# Patient Record
Sex: FEMALE | Race: WHITE | URBAN - METROPOLITAN AREA
[De-identification: names, ages, dates, MRNs, and addresses within clinical notes are randomized per-mention and may not be internally consistent; named-entity substitution may affect disease eponyms.]

---

## 2020-11-25 ENCOUNTER — OFFICE VISIT (OUTPATIENT)
Dept: ENT CLINIC | Age: 65
End: 2020-11-25
Payer: COMMERCIAL

## 2020-11-25 VITALS
SYSTOLIC BLOOD PRESSURE: 126 MMHG | OXYGEN SATURATION: 98 % | TEMPERATURE: 97.5 F | DIASTOLIC BLOOD PRESSURE: 90 MMHG | HEART RATE: 78 BPM | WEIGHT: 195 LBS | HEIGHT: 64 IN | RESPIRATION RATE: 16 BRPM | BODY MASS INDEX: 33.29 KG/M2

## 2020-11-25 PROCEDURE — 99203 OFFICE O/P NEW LOW 30 MIN: CPT | Performed by: OTOLARYNGOLOGY

## 2020-11-25 RX ORDER — AMLODIPINE BESYLATE 10 MG/1
10 TABLET ORAL DAILY
COMMUNITY

## 2020-11-25 RX ORDER — AMOXICILLIN AND CLAVULANATE POTASSIUM 875; 125 MG/1; MG/1
1 TABLET, FILM COATED ORAL 2 TIMES DAILY
Qty: 28 TABLET | Refills: 0 | Status: SHIPPED | OUTPATIENT
Start: 2020-11-25 | End: 2020-11-25

## 2020-11-25 NOTE — PROGRESS NOTES
[facility]       NEW PATIENT VISIT    PCP:  Ricky Babcock PA-C    REFERRED BY:   [facility]      CHIEF COMPLAINT:  Chief Complaint   Patient presents with    Ear Problem       HISTORY OF PRESENT ILLNESS:       Cheli Rose is a 63 y.o. female here for evaluation and treatment of a problem located in the ears. The quality is hearing loss. The severity is moderate. The duration is one year. The timing is constant. Associated symptoms include otalgia, hearing loss, and tinnitus left ear. Otalgia, \"sometimes, when I be digging and scratchin in em. \"  Has had the hearing loss, left ear, past one year. \"Sometimes I can't hear. \"  Tinnitus left ear, sometimes, twice a year last for about 6 hours, for about one year. Tender to touch. \"Sometimes my ear is itchy. \"   \"Sometimes my throat swells up and I taste blood.'  She takes two allergy pills a day. She has had swelling of her submandibular gland. IZZY Babcock \"said it was a gland that not producing saliva. \"  She has been using \"sour patches\" candy per recommendation of PCP. I have a sonogram.  No treatment so far just the candy. REVIEW OF SYSTEMS:    CONSTITUTIONAL:  Denied fever. Denied unexplained weight loss, over 20 pounds in the past six months. EARS, NOSE, THROAT:   Denied otorrhea, rhinorrhea, nasal dyspnea, epistaxis,  sore throat and hoarseness. PAST MEDICAL HISTORY:    History reviewed. No pertinent past medical history. History reviewed. No pertinent surgical history. EXAMINATION:    Vitals:    11/25/20 1109   BP: (!) 126/90   Site: Left Upper Arm   Position: Sitting   Cuff Size: Medium Adult   Pulse: 78   Resp: 16   Temp: 97.5 °F (36.4 °C)   TempSrc: Tympanic   SpO2: 98%   Weight: 195 lb (88.5 kg)   Height: 5' 4\" (1.626 m)     VITALS SIGNS were reviewed. GENERAL APPEARANCE: WDWN NAD, Alert and oriented X 3.   EYES: Extraocular motion was intact, bilaterally. Normal primary gaze alignment. Sclera and conjunctiva clear bilaterally. ABILITY TO COMMUNICATE/QUALITY OF VOICE:  Normal, no hoarseness or hot potato quality. (+) SALIVARY GLANDS: There was mild enlargement and induration of the left submandibular gland with mild tenderness. The right submandibular gland was normal.  The parotid gland was normal bilaterally. INSPECTION, HEAD AND FACE:  Normal with no masses, lesions, tenderness, or deformities. FACIAL STRENGTH, MOTION:  Facial nerve function intact and equal bilaterally for all 5 branches. SINUSES:  Frontal sinuses and maxillary sinuses nontender, bilaterally. HEARING ASSESSMENT:  Hearing was intact to finger rub at the external meatus bilaterally. Tuning fork tests, using a 512 Hz tuning fork, showed the Varner test was heard in the midline. The Rinne test showed air conduction greater than bone conduction bilaterally. EXTERNAL EAR/NOSE:  The pinnae, mastoids, and external nose were normal bilaterally. EARS, OTOSCOPY:  The tympanic membranes and external auditory canals were normal bilaterally. NOSE:  The nasal septum was midline. The inferior turbinates were normal bilaterally. Nasal mucosa and nasal secretions were normal bilaterally. LIPS, TEETH AND GUMS:  Normal.  OROPHARYNX/ORAL CAVITY:  Normal mucosa with no ulcerations, masses, lesions, erythema, exudate, or other abnormalities. NECK:  Normal with no masses, tenderness, or tracheal deviation, and with normal laryngeal crepitus. THYROID:  Normal, with no nodules, goiter, or tenderness bilaterally. LYMPH NODES, CERVICAL, FACIAL AND SUPRACLAVICULAR:  No lymphadenopathy detected. Jv Rodriguez / Salma Russell / Елена Norman:       Dayton Hager was seen today for ear problem. Diagnoses and all orders for this visit:    Sialoadenitis of submandibular gland  Comments:  left   Orders:  -     Amoxicillin-clavulanate (AUGMENTIN) 875-125 MG per tablet;  Take 1 twice daily while taking the antibiotic.   Continue for two to three days after completion of the antibiotic.      ~~~>>> Also please read all information given to you by the pharmacist.

## 2020-11-25 NOTE — PATIENT INSTRUCTIONS
SUBMANDIBULAR SALIVARY GLAND INFECTION MEASURES  1. Use lemon slice sialogogues: 1/4 inch slice cut in half, bite and suck slowly for 5 minutes, then massage the left submandibular gland for five minutes, four times daily. Rinse your mouth with clear water after using the lemon slice. 2. Heating pad therapy:  Apply heat to the affected area as directed, four times daily, on medium heat setting, for 30 to 45 minutes, with a one minute break every 5 minutes. ADVERSE AND SIDE EFFECTS OF MEDICATIONS:    Please be aware of the following possible adverse reactions, side effects, and complications of the following medications, including, but not limited to: allergic reaction, interactions with other medications, nausea, headache, diarrhea, persistent symptoms, failure to improve, and the following:       Augmentin:  diarrhea, colitis (severe infection and inflammation of the large intestine), pseudomembranous colitis (severe infection and inflammation of the colon, usually due to C. difficile bacteria)  yeast or fungal  infections, Kaye-Dat syndrome (very rare necrotic skin reaction with peeling of skin, blisters and arthritis), persistent symptoms/infection, and failure to improve. Taking Probiotic while you are taking antibiotics, may help to prevent diarrhea, stomach upset, pseudomembranous colitis, and C. difficile diarrhea. This may be obtained at your pharmacy or health food store. Alternatively, you may eat one cup of yogurt with active or live cultures twice daily while taking the antibiotic.   Continue for two to three days after completion of the antibiotic.      ~~~>>> Also please read all information given to you by the pharmacist.

## 2020-12-07 ENCOUNTER — PROCEDURE VISIT (OUTPATIENT)
Dept: AUDIOLOGY | Age: 65
End: 2020-12-07
Payer: COMMERCIAL

## 2020-12-07 PROCEDURE — 92557 COMPREHENSIVE HEARING TEST: CPT | Performed by: AUDIOLOGIST

## 2020-12-07 PROCEDURE — 92550 TYMPANOMETRY & REFLEX THRESH: CPT | Performed by: AUDIOLOGIST

## 2020-12-07 NOTE — PROGRESS NOTES
[facility]  Division of Audiology/Otolaryngology        PCP:  No PCP  MRN: [id number]     CHIEF COMPLAINT: Itchy Ears, Tinnitus      HISTORY OF PRESENT ILLNESS  Jun Holt was seen for hearing and middle ear evaluation. Otolaryngology  is referral source of today's appointment. Patient presents with itchy ears as primary concern, in addition to tinnitus. The degree of itching is reportedly severe. AUDIOGRAM & IMMITTANCE    Hearing status is essentially mild, bilaterally. The loss is of sensory nature. Loss is symmetrical. Good speech discernment demonstrated in quiet, at elevated levels. Immittance testing is consistent with normal middle ear status, that shows hypermobility  (Type Ad tympanogram, AU). Some ipsilateral acoustic reflexes present.                  RECOMMENDATIONS / PLAN:    Follow medical recommendations

## 2020-12-10 ENCOUNTER — OFFICE VISIT (OUTPATIENT)
Dept: ENT CLINIC | Age: 65
End: 2020-12-10
Payer: COMMERCIAL

## 2020-12-10 VITALS — DIASTOLIC BLOOD PRESSURE: 82 MMHG | HEART RATE: 84 BPM | TEMPERATURE: 97.7 F | SYSTOLIC BLOOD PRESSURE: 120 MMHG

## 2020-12-10 PROCEDURE — 99213 OFFICE O/P EST LOW 20 MIN: CPT | Performed by: OTOLARYNGOLOGY

## 2020-12-10 RX ORDER — AMOXICILLIN AND CLAVULANATE POTASSIUM 875; 125 MG/1; MG/1
1 TABLET, FILM COATED ORAL 2 TIMES DAILY
Qty: 14 TABLET | Refills: 0 | Status: SHIPPED | OUTPATIENT
Start: 2020-12-10 | End: 2020-12-17

## 2020-12-10 NOTE — PROGRESS NOTES
[facility]         PCP:  Perla Echavarria PA-C      CHIEF COMPLAINT:  Chief Complaint   Patient presents with    Follow-up     ears are still itching, gland feels like it's going down, no other concerns        HISTORY OF PRESENT ILLNESS:   Heaven Obregon is a 63 y.o. female here for recheck and follow-up of a problem located in left submandibular gland and ears. The quality is infection of the submandibular gland. Since the last visit here, \"the swelling in the gland has gone down some. But, it's still swollen. I can still feel this little knot but it has gotten smaller. Both ear are still itching. \"      REVIEW OF SYSTEMS:   CONSTITUTIONAL:  Denied fever. Denied chills. EARS, NOSE, THROAT:  Denied otorrhea, otalgia, rhinorrhea, hoarseness, and sore throat. EXAMINATION:      Vitals:    12/10/20 1326   BP: 120/82   Site: Left Upper Arm   Position: Sitting   Cuff Size: Large Adult   Pulse: 84   Temp: 97.7 °F (36.5 °C)   TempSrc: Skin     VITALS SIGNS were reviewed. GENERAL APPEARANCE:  Well developed, well nourished, no apparent distress, no apparent deformities. ABILITY TO COMMUNICATE/QUALITY OF VOICE:  Communicated without difficulty. Normal voice. No hot potato voice. No hoarseness. EXTERNAL EAR/NOSE:  Normal pinnae and mastoids. Normal external nose. EARS, OTOSCOPY: The TMs and EACs appeared to be normal bilaterally. NOSE:  The nasal septum was midline. The inferior turbinates were normal.  The nasal mucosa and secretions were normal.  No pus or polyps were seen. OROPHARYNX/ORAL CAVITY:  Oral mucosa, hard and soft palates, tongue, and pharynx were normal.      NECK:  No masses or tenderness. The laryngeal cartilages and hyoid bone were normal, with normal laryngeal crepitus. No abnormal appearance, asymmetry or abnormal tracheal position.   PALPATION OF LYMPH NODES, CERVICAL, FACIAL AND SUPRACLAVICULAR:  No

## 2020-12-10 NOTE — PATIENT INSTRUCTIONS
Use two drops of baby oil in each ear daily prn itching. SUBMANDIBULAR SALIVARY GLAND INFECTION MEASURES  1. Use lemon slice sialogogues: 1/4 inch slice cut in half, bite and suck slowly for 5 minutes, then massage the left submandibular gland for five minutes, four times daily. Rinse your mouth with clear water after using the lemon slice. 2. Heating pad therapy:  Apply heat to the affected area as directed, four times daily, on medium heat setting, for 30 to 45 minutes, with a one minute break every 5 minutes. ADVERSE AND SIDE EFFECTS OF MEDICATIONS:    Please be aware of the following possible adverse reactions, side effects, and complications of the following medications, including, but not limited to: allergic reaction, interactions with other medications, nausea, headache, diarrhea, persistent symptoms, failure to improve, and the following:       Augmentin:  diarrhea, colitis (severe infection and inflammation of the large intestine), pseudomembranous colitis (severe infection and inflammation of the colon, usually due to C. difficile bacteria)  yeast or fungal  infections, Kaye-Dat syndrome (very rare necrotic skin reaction with peeling of skin, blisters and arthritis), persistent symptoms/infection, and failure to improve. Taking Probiotic while you are taking antibiotics, may help to prevent diarrhea, stomach upset, pseudomembranous colitis, and C. difficile diarrhea. This may be obtained at your pharmacy or health food store. Alternatively, you may eat one cup of yogurt with active or live cultures twice daily while taking the antibiotic.   Continue for two to three days after completion of the antibiotic.      ~~~>>> Also please read all information given to you by the pharmacist.

## 2020-12-11 PROBLEM — H91.93 BILATERAL HEARING LOSS: Status: ACTIVE | Noted: 2020-12-11

## 2020-12-11 PROBLEM — K11.20 SIALOADENITIS OF SUBMANDIBULAR GLAND: Status: ACTIVE | Noted: 2020-12-11

## 2020-12-11 PROBLEM — H93.12 SUBJECTIVE TINNITUS OF LEFT EAR: Status: ACTIVE | Noted: 2020-12-11

## 2022-07-21 ENCOUNTER — OFFICE VISIT (OUTPATIENT)
Dept: ENT CLINIC | Age: 67
End: 2022-07-21
Payer: MEDICARE

## 2022-07-21 VITALS — SYSTOLIC BLOOD PRESSURE: 121 MMHG | TEMPERATURE: 96.9 F | HEART RATE: 82 BPM | DIASTOLIC BLOOD PRESSURE: 81 MMHG

## 2022-07-21 DIAGNOSIS — H60.8X3 CHRONIC ECZEMATOID OTITIS EXTERNA OF BOTH EARS: Primary | Chronic | ICD-10-CM

## 2022-07-21 DIAGNOSIS — J38.3 AGE-RELATED VOCAL CORD ATROPHY: Chronic | ICD-10-CM

## 2022-07-21 DIAGNOSIS — K11.23 CHRONIC SIALOADENITIS: Chronic | ICD-10-CM

## 2022-07-21 PROCEDURE — 1123F ACP DISCUSS/DSCN MKR DOCD: CPT | Performed by: OTOLARYNGOLOGY

## 2022-07-21 PROCEDURE — 99214 OFFICE O/P EST MOD 30 MIN: CPT | Performed by: OTOLARYNGOLOGY

## 2022-07-21 RX ORDER — FLUOCINOLONE ACETONIDE 0.11 MG/ML
OIL AURICULAR (OTIC)
Qty: 20 ML | Refills: 2 | Status: SHIPPED | OUTPATIENT
Start: 2022-07-21

## 2022-07-21 ASSESSMENT — ENCOUNTER SYMPTOMS
SORE THROAT: 0
RHINORRHEA: 0
SINUS PAIN: 0
TROUBLE SWALLOWING: 0
VOICE CHANGE: 0

## 2022-07-21 NOTE — PROGRESS NOTES
[facility]       NEW PATIENT VISIT      PCP:  Arminda Reyes PA-C      CHIEF COMPLAINT  Chief Complaint   Patient presents with    Pharyngitis     Sore throat     Ear Problem       HISTORY OF PRESENT ILLNESS       Nargis Maradiaga is a 74 y.o. female who presented today for evaluation and management for Both ears itch constantly for several years. Feels pain in ears off and on like an earache, once or twice a month, couple sharp pains for a few seconds, and then goes away. Burning sensation inside the throat and tingles, for about one year, off and on, comes on 3 times a week, lasts 1.5 hours. Sometimes neck left AC area, puffy this morning but not swollen now. REVIEW OF SYSTEMS   Review of Systems   Constitutional:  Negative for chills, fever and unexpected weight change. HENT:  Positive for ear pain. Negative for congestion, ear discharge, hearing loss, rhinorrhea, sinus pain, sore throat, tinnitus, trouble swallowing and voice change. PAST MEDICAL HISTORY    History reviewed. No pertinent past medical history. History reviewed. No pertinent surgical history. EXAMINATION    Vitals:    07/21/22 1427   BP: 121/81   Site: Left Upper Arm   Position: Sitting   Cuff Size: Large Adult   Pulse: 82   Temp: 96.9 °F (36.1 °C)   TempSrc: Temporal     General:  WDWN, NAD, alert and oriented  Face: There was no swelling or lesions detected. Voice: Normal with no hoarseness or hot potato voice. Ears:  TMs and EACs appeared to be normal. .      Nose: The nasal septum, turbinates, secretions, and mucosa appeared to be normal.   Sinuses:  Maxillary and frontal sinuses were nontender to palpation and percussion.     Oral cavity:  Mucosa, secretions, tongue, and gingiva appeared to be normal.   Oropharynx:  The palatine tonsils, hard and soft palates, uvula, tongue, posterior oropharyngeal wall, mucosa and secretions appeared to be normal.     (+) INDIRECT LARYNGOSCOPY:  The vocal cords were atrophic with bowing on phonation consistent with dysphonia of aging. Vocal cords appeared to be normal with no leukoplakia, mass, polyp, nodule or ulceration and appeared to be normally mobile bilaterally with midline approximation on phonation. Otherwise, The epiglottis, supraglottis, false vocal cords, true vocal cords, base of tongue, subglottis, and piriform sinuses appeared to be normal.    (+) Salivary Glands:  SMG mildly prominent. Patient identified the left SMG as site of swelling and tenderness. Normal bilateral parotid and bilateral submandibular salivary glands. Neck:  No masses or tenderness. Trachea midline. Laryngeal cartilages and hyoid bone normal.  Normal laryngeal creptus. Thyroid:  Normal, nontender, no goiter or nodules palpable. Lymph nodes:  No cervical lymphadenopathy. Eusebio Salinas / Perry Stevens / Edward Lopez was seen today for pharyngitis and ear problem. Diagnoses and all orders for this visit:    Chronic eczematoid otitis externa of both ears  -     fluocinolone (DERMOTIC) 0.01 % OIL oil; Place 5 drops in the affected ear(s) 2 times daily, for 7 to 14 days, as needed for dermatitis or itching. If symptoms persists longer than 14 days, call office for appointment. Chronic sialoadenitis  Comments:  left SMG, intermittent swelling, with no evidence of acute disease. 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 Tylenol) or Ibuprofen (eg. Advil) (over the counter medications) as needed for fever or pain. Return in about 6 months (around 1/21/2023) for recheck/follow-up, and sooner if condition worsens.

## 2022-08-23 ENCOUNTER — NURSE TRIAGE (OUTPATIENT)
Dept: OTHER | Facility: CLINIC | Age: 67
End: 2022-08-23

## 2022-08-23 NOTE — TELEPHONE ENCOUNTER
Received call from [facility] at [facility] with Red Flag Complaint. Subjective: Caller states \"When I am standing my right leg gets cold and tingles. I have been having both knee pain, wrist, and back pain. My doctor took blood and told me I have RA. I don't know if it is connected to my legs getting cold and tingling. \"     Current Symptoms: when standing from hip to knee get cold and tingling sensation. When sit sensation resolve. Onset after 30 minutes of standing. Onset: 1 year ago; unchanged    Associated Symptoms: bilateral knee, right wrist, back pain. No recent injury. Restless legs at night when trying to sleep. Pain Severity: 8/10; throbbing; intermittent-associated with standing. Stands for her job. no pain at this time. Temperature: denies. Has not checked. What has been tried: Aleve in the past.     LMP: NA Pregnant: NA    Recommended disposition: See in Office Within 3 Days. Informed if unable to get an appt with provider to go to urgent care or walk in clinic. Pt wants to be a new patient at [facility]. Care advice provided, patient verbalizes understanding; denies any other questions or concerns; instructed to call back for any new or worsening symptoms. Patient/Caller agrees with recommended disposition; writer provided warm transfer to [facility] at [facility] for appointment scheduling     Attention Provider: Thank you for allowing me to participate in the care of your patient. The patient was connected to triage in response to information provided to the ECC/PSC. Please do not respond through this encounter as the response is not directed to a shared pool.           Reason for Disposition   MODERATE pain (e.g., interferes with normal activities, limping) and present > 3 days    Protocols used: Leg Pain-ADULT-[place]

## 2023-08-31 ENCOUNTER — HOSPITAL ENCOUNTER (OUTPATIENT)
Dept: MAMMOGRAPHY | Age: 68
Discharge: HOME | End: 2023-08-31
Payer: MEDICARE

## 2023-08-31 VITALS — BODY MASS INDEX: 31.01 KG/M2 | HEIGHT: 63 IN | WEIGHT: 175 LBS

## 2023-08-31 DIAGNOSIS — Z12.31 VISIT FOR SCREENING MAMMOGRAM: ICD-10-CM

## 2023-08-31 PROCEDURE — 77063 BREAST TOMOSYNTHESIS BI: CPT

## 2023-11-17 ENCOUNTER — HOSPITAL ENCOUNTER (OUTPATIENT)
Dept: WOMENS IMAGING | Age: 68
Discharge: HOME | End: 2023-11-17
Payer: MEDICARE

## 2023-11-17 ENCOUNTER — TELEPHONE (OUTPATIENT)
Dept: WOMENS IMAGING | Age: 68
End: 2023-11-17

## 2023-11-17 ENCOUNTER — HOSPITAL ENCOUNTER (OUTPATIENT)
Dept: ULTRASOUND IMAGING | Age: 68
Discharge: HOME | End: 2023-11-17
Payer: MEDICARE

## 2023-11-17 DIAGNOSIS — R92.8 ABNORMAL MAMMOGRAM OF BOTH BREASTS: ICD-10-CM

## 2023-11-17 DIAGNOSIS — R92.8 ABNORMAL MAMMOGRAM: Primary | ICD-10-CM

## 2023-11-17 PROCEDURE — 76642 ULTRASOUND BREAST LIMITED: CPT

## 2023-11-17 PROCEDURE — G0279 TOMOSYNTHESIS, MAMMO: HCPCS

## 2023-11-17 NOTE — TELEPHONE ENCOUNTER
Nurse Navigator reviewed pre-procedure ultrasound guided breast biopsy and stereotactic guided biopsy patient education information in person and gave written instructions. Reviewed medications and need to hold all blood thinners per instructions. Patient should take all other medications as prescribed. Patient can eat and drink as normal prior to the procedure. Be sure to wear a bra with good support and a two piece outfit for comfort. Patient can bring someone with you but you can also drive yourself. Plan on being at the breast center for 2-2 and a half hours. Reviewed the process of a ultrasound biopsy. The skin is cleaned and a local anesthetic is given to numb the area. A small skin nick is made for the biopsy needle and then tissue samples are taken. A tiny marker is then placed inside your breast at the site of the biopsy for future reference. Pressure is then held on the biopsy site to stop bleeding and steri strips, bandage and waterproof dressing is applied. A mammogram is then done to validate the tissue marker. The tissue sample is sent to pathology. Results will come back in 2-3 business days and sent to your referring physician. Either they or the nurse navigator will call you with the results and recommended follow up needed. Patients states understanding.

## 2023-12-07 ENCOUNTER — HOSPITAL ENCOUNTER (OUTPATIENT)
Dept: ULTRASOUND IMAGING | Age: 68
Discharge: HOME | End: 2023-12-07
Payer: MEDICARE

## 2023-12-07 ENCOUNTER — HOSPITAL ENCOUNTER (OUTPATIENT)
Dept: WOMENS IMAGING | Age: 68
Discharge: HOME | End: 2023-12-07
Payer: MEDICARE

## 2023-12-07 DIAGNOSIS — R92.8 ABNORMAL MAMMOGRAM: ICD-10-CM

## 2023-12-07 PROCEDURE — 88305 TISSUE EXAM BY PATHOLOGIST: CPT

## 2023-12-07 PROCEDURE — 2500000003 HC RX 250 WO HCPCS: Performed by: RADIOLOGY

## 2023-12-07 PROCEDURE — 2709999900 MAM STEREO BREAST BX W LOC DEVICE 1ST LESION RIGHT

## 2023-12-07 PROCEDURE — 19083 BX BREAST 1ST LESION US IMAG: CPT

## 2023-12-07 PROCEDURE — 2500000003 HC RX 250 WO HCPCS: Performed by: NURSE PRACTITIONER

## 2023-12-07 PROCEDURE — 88360 TUMOR IMMUNOHISTOCHEM/MANUAL: CPT

## 2023-12-07 PROCEDURE — 77065 DX MAMMO INCL CAD UNI: CPT

## 2023-12-07 PROCEDURE — 38505 NEEDLE BIOPSY LYMPH NODES: CPT

## 2023-12-07 PROCEDURE — 19084 BX BREAST ADD LESION US IMAG: CPT

## 2023-12-07 PROCEDURE — 76098 X-RAY EXAM SURGICAL SPECIMEN: CPT

## 2023-12-07 PROCEDURE — 88341 IMHCHEM/IMCYTCHM EA ADD ANTB: CPT

## 2023-12-07 PROCEDURE — 88342 IMHCHEM/IMCYTCHM 1ST ANTB: CPT

## 2023-12-07 RX ORDER — LIDOCAINE HYDROCHLORIDE 10 MG/ML
30 INJECTION, SOLUTION EPIDURAL; INFILTRATION; INTRACAUDAL; PERINEURAL ONCE
Status: COMPLETED | OUTPATIENT
Start: 2023-12-07 | End: 2023-12-07

## 2023-12-07 RX ORDER — LIDOCAINE HYDROCHLORIDE AND EPINEPHRINE 10; 10 MG/ML; UG/ML
20 INJECTION, SOLUTION INFILTRATION; PERINEURAL ONCE
Status: COMPLETED | OUTPATIENT
Start: 2023-12-07 | End: 2023-12-07

## 2023-12-07 RX ORDER — LIDOCAINE HYDROCHLORIDE 10 MG/ML
5 INJECTION, SOLUTION INFILTRATION; PERINEURAL ONCE
Status: COMPLETED | OUTPATIENT
Start: 2023-12-07 | End: 2023-12-07

## 2023-12-07 RX ADMIN — LIDOCAINE HYDROCHLORIDE,EPINEPHRINE BITARTRATE 20 ML: 10; .01 INJECTION, SOLUTION INFILTRATION; PERINEURAL at 12:49

## 2023-12-07 RX ADMIN — LIDOCAINE HYDROCHLORIDE 15 ML: 10 INJECTION, SOLUTION EPIDURAL; INFILTRATION; INTRACAUDAL; PERINEURAL at 12:49

## 2023-12-07 RX ADMIN — LIDOCAINE HYDROCHLORIDE ANHYDROUS 2 ML: 10 INJECTION, SOLUTION INFILTRATION at 11:30

## 2023-12-07 RX ADMIN — LIDOCAINE HYDROCHLORIDE,EPINEPHRINE BITARTRATE 20 ML: 10; .01 INJECTION, SOLUTION INFILTRATION; PERINEURAL at 11:31

## 2023-12-07 ASSESSMENT — PAIN SCALES - GENERAL
PAINLEVEL_OUTOF10: 0
PAINLEVEL_OUTOF10: 0
PAINLEVEL_OUTOF10: 5

## 2023-12-07 ASSESSMENT — PAIN DESCRIPTION - ORIENTATION: ORIENTATION: RIGHT

## 2023-12-07 ASSESSMENT — PAIN DESCRIPTION - DESCRIPTORS: DESCRIPTORS: BURNING;DISCOMFORT;SHARP

## 2023-12-07 ASSESSMENT — PAIN - FUNCTIONAL ASSESSMENT: PAIN_FUNCTIONAL_ASSESSMENT: ACTIVITIES ARE NOT PREVENTED

## 2023-12-07 ASSESSMENT — PAIN DESCRIPTION - LOCATION: LOCATION: BREAST

## 2023-12-08 ENCOUNTER — FOLLOWUP TELEPHONE ENCOUNTER (OUTPATIENT)
Dept: WOMENS IMAGING | Age: 68
End: 2023-12-08

## 2023-12-08 NOTE — TELEPHONE ENCOUNTER
Nurse Navigator reviewed breast biopsy results with Shey Garsia and Yaritza Jett showing IDC on three breast biopsies, -/-/= on the pathology report and postive IDC metastatic on the lymph node -/-/+. NN explained the terminology and reviewed the results for ER/DC and the additional HER2 test.   We discussed several questions and process for establishing a care team and possible additional testing. [facility] and patient prefers to stay at [facility]. NN offered additional website reading if interested, declined at this time. Hiro Montenegro lives alone, has a daughter Olaf Pérez (very close) and two sons who also live in the area, 8 grands. Her  passed in 2013. 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 She has a strong piotr but this was very difficult and unexpected as it was her baseline mammogram.    Pt/family given NN phone number for further assistance and plan to f/u on Monday to reach out to [facility] for oncology referral first (due to her results) as well as eventually to see breast surgeon, Dr Osei Veloz. Pt and family in agreement.

## 2023-12-27 ENCOUNTER — HOSPITAL ENCOUNTER (OUTPATIENT)
Age: 68
Setting detail: OUTPATIENT SURGERY
Discharge: HOME | End: 2023-12-27
Attending: SURGERY | Admitting: SURGERY
Payer: MEDICARE

## 2023-12-27 ENCOUNTER — APPOINTMENT (OUTPATIENT)
Dept: GENERAL RADIOLOGY | Age: 68
End: 2023-12-27
Attending: SURGERY
Payer: MEDICARE

## 2023-12-27 ENCOUNTER — ANESTHESIA (OUTPATIENT)
Dept: OPERATING ROOM | Age: 68
End: 2023-12-27
Payer: MEDICARE

## 2023-12-27 ENCOUNTER — ANESTHESIA EVENT (OUTPATIENT)
Dept: OPERATING ROOM | Age: 68
End: 2023-12-27
Payer: MEDICARE

## 2023-12-27 VITALS
RESPIRATION RATE: 16 BRPM | HEIGHT: 63 IN | DIASTOLIC BLOOD PRESSURE: 78 MMHG | WEIGHT: 180.4 LBS | TEMPERATURE: 97 F | HEART RATE: 70 BPM | OXYGEN SATURATION: 97 % | BODY MASS INDEX: 31.96 KG/M2 | SYSTOLIC BLOOD PRESSURE: 147 MMHG

## 2023-12-27 DIAGNOSIS — C50.911 MALIGNANT NEOPLASM OF RIGHT FEMALE BREAST, UNSPECIFIED ESTROGEN RECEPTOR STATUS, UNSPECIFIED SITE OF BREAST (HCC): Primary | ICD-10-CM

## 2023-12-27 LAB
ABO + RH BLD: NORMAL
BLD GP AB SCN SERPL QL: NORMAL
GLUCOSE BLD-MCNC: 100 MG/DL (ref 70–99)
GLUCOSE BLD-MCNC: 95 MG/DL (ref 70–99)
PERFORMED ON: ABNORMAL
PERFORMED ON: NORMAL

## 2023-12-27 PROCEDURE — 77001 FLUOROGUIDE FOR VEIN DEVICE: CPT

## 2023-12-27 PROCEDURE — 3600000002 HC SURGERY LEVEL 2 BASE: Performed by: SURGERY

## 2023-12-27 PROCEDURE — 6360000002 HC RX W HCPCS: Performed by: SURGERY

## 2023-12-27 PROCEDURE — 2580000003 HC RX 258: Performed by: STUDENT IN AN ORGANIZED HEALTH CARE EDUCATION/TRAINING PROGRAM

## 2023-12-27 PROCEDURE — 7100000001 HC PACU RECOVERY - ADDTL 15 MIN: Performed by: SURGERY

## 2023-12-27 PROCEDURE — 3700000000 HC ANESTHESIA ATTENDED CARE: Performed by: SURGERY

## 2023-12-27 PROCEDURE — 7100000010 HC PHASE II RECOVERY - FIRST 15 MIN: Performed by: SURGERY

## 2023-12-27 PROCEDURE — 6370000000 HC RX 637 (ALT 250 FOR IP): Performed by: STUDENT IN AN ORGANIZED HEALTH CARE EDUCATION/TRAINING PROGRAM

## 2023-12-27 PROCEDURE — 6360000002 HC RX W HCPCS: Performed by: NURSE ANESTHETIST, CERTIFIED REGISTERED

## 2023-12-27 PROCEDURE — A4217 STERILE WATER/SALINE, 500 ML: HCPCS | Performed by: SURGERY

## 2023-12-27 PROCEDURE — 36415 COLL VENOUS BLD VENIPUNCTURE: CPT

## 2023-12-27 PROCEDURE — 7100000011 HC PHASE II RECOVERY - ADDTL 15 MIN: Performed by: SURGERY

## 2023-12-27 PROCEDURE — 2500000003 HC RX 250 WO HCPCS: Performed by: NURSE ANESTHETIST, CERTIFIED REGISTERED

## 2023-12-27 PROCEDURE — C1769 GUIDE WIRE: HCPCS | Performed by: SURGERY

## 2023-12-27 PROCEDURE — 6360000002 HC RX W HCPCS: Performed by: STUDENT IN AN ORGANIZED HEALTH CARE EDUCATION/TRAINING PROGRAM

## 2023-12-27 PROCEDURE — 2709999900 HC NON-CHARGEABLE SUPPLY: Performed by: SURGERY

## 2023-12-27 PROCEDURE — 2580000003 HC RX 258: Performed by: NURSE ANESTHETIST, CERTIFIED REGISTERED

## 2023-12-27 PROCEDURE — 2580000003 HC RX 258: Performed by: SURGERY

## 2023-12-27 PROCEDURE — 3600000012 HC SURGERY LEVEL 2 ADDTL 15MIN: Performed by: SURGERY

## 2023-12-27 PROCEDURE — 86850 RBC ANTIBODY SCREEN: CPT

## 2023-12-27 PROCEDURE — 7100000000 HC PACU RECOVERY - FIRST 15 MIN: Performed by: SURGERY

## 2023-12-27 PROCEDURE — 86901 BLOOD TYPING SEROLOGIC RH(D): CPT

## 2023-12-27 PROCEDURE — C1788 PORT, INDWELLING, IMP: HCPCS | Performed by: SURGERY

## 2023-12-27 PROCEDURE — 86900 BLOOD TYPING SEROLOGIC ABO: CPT

## 2023-12-27 PROCEDURE — 3700000001 HC ADD 15 MINUTES (ANESTHESIA): Performed by: SURGERY

## 2023-12-27 PROCEDURE — 71045 X-RAY EXAM CHEST 1 VIEW: CPT

## 2023-12-27 DEVICE — PORT INFUS SGL LUMN ATTCH POLYUR OPN END CATH 8FR POWERPRT: Type: IMPLANTABLE DEVICE | Site: CHEST | Status: FUNCTIONAL

## 2023-12-27 RX ORDER — BUPIVACAINE HYDROCHLORIDE 5 MG/ML
INJECTION, SOLUTION EPIDURAL; INTRACAUDAL
Status: COMPLETED | OUTPATIENT
Start: 2023-12-27 | End: 2023-12-27

## 2023-12-27 RX ORDER — SODIUM CHLORIDE 0.9 % (FLUSH) 0.9 %
5-40 SYRINGE (ML) INJECTION EVERY 12 HOURS SCHEDULED
Status: DISCONTINUED | OUTPATIENT
Start: 2023-12-27 | End: 2023-12-27 | Stop reason: HOSPADM

## 2023-12-27 RX ORDER — FENTANYL CITRATE 50 UG/ML
50 INJECTION, SOLUTION INTRAMUSCULAR; INTRAVENOUS EVERY 5 MIN PRN
Status: DISCONTINUED | OUTPATIENT
Start: 2023-12-27 | End: 2023-12-27 | Stop reason: HOSPADM

## 2023-12-27 RX ORDER — SODIUM CHLORIDE, SODIUM LACTATE, POTASSIUM CHLORIDE, CALCIUM CHLORIDE 600; 310; 30; 20 MG/100ML; MG/100ML; MG/100ML; MG/100ML
INJECTION, SOLUTION INTRAVENOUS CONTINUOUS
Status: DISCONTINUED | OUTPATIENT
Start: 2023-12-27 | End: 2023-12-27 | Stop reason: HOSPADM

## 2023-12-27 RX ORDER — OXYCODONE HYDROCHLORIDE 5 MG/1
5 TABLET ORAL
Status: DISCONTINUED | OUTPATIENT
Start: 2023-12-27 | End: 2023-12-27 | Stop reason: HOSPADM

## 2023-12-27 RX ORDER — PROPOFOL 10 MG/ML
INJECTION, EMULSION INTRAVENOUS PRN
Status: DISCONTINUED | OUTPATIENT
Start: 2023-12-27 | End: 2023-12-27 | Stop reason: SDUPTHER

## 2023-12-27 RX ORDER — SODIUM CHLORIDE 9 MG/ML
INJECTION, SOLUTION INTRAVENOUS PRN
Status: DISCONTINUED | OUTPATIENT
Start: 2023-12-27 | End: 2023-12-27 | Stop reason: HOSPADM

## 2023-12-27 RX ORDER — ACETAMINOPHEN 325 MG/1
650 TABLET ORAL ONCE
Status: COMPLETED | OUTPATIENT
Start: 2023-12-27 | End: 2023-12-27

## 2023-12-27 RX ORDER — LIDOCAINE HYDROCHLORIDE 20 MG/ML
INJECTION, SOLUTION EPIDURAL; INFILTRATION; INTRACAUDAL; PERINEURAL PRN
Status: DISCONTINUED | OUTPATIENT
Start: 2023-12-27 | End: 2023-12-27 | Stop reason: SDUPTHER

## 2023-12-27 RX ORDER — GLYCOPYRROLATE 0.2 MG/ML
INJECTION INTRAMUSCULAR; INTRAVENOUS PRN
Status: DISCONTINUED | OUTPATIENT
Start: 2023-12-27 | End: 2023-12-27 | Stop reason: SDUPTHER

## 2023-12-27 RX ORDER — SODIUM CHLORIDE 0.9 % (FLUSH) 0.9 %
5-40 SYRINGE (ML) INJECTION PRN
Status: DISCONTINUED | OUTPATIENT
Start: 2023-12-27 | End: 2023-12-27 | Stop reason: HOSPADM

## 2023-12-27 RX ORDER — SODIUM CHLORIDE, SODIUM LACTATE, POTASSIUM CHLORIDE, CALCIUM CHLORIDE 600; 310; 30; 20 MG/100ML; MG/100ML; MG/100ML; MG/100ML
INJECTION, SOLUTION INTRAVENOUS CONTINUOUS PRN
Status: DISCONTINUED | OUTPATIENT
Start: 2023-12-27 | End: 2023-12-27 | Stop reason: SDUPTHER

## 2023-12-27 RX ORDER — ONDANSETRON 2 MG/ML
4 INJECTION INTRAMUSCULAR; INTRAVENOUS
Status: DISCONTINUED | OUTPATIENT
Start: 2023-12-27 | End: 2023-12-27 | Stop reason: HOSPADM

## 2023-12-27 RX ORDER — MAGNESIUM HYDROXIDE 1200 MG/15ML
LIQUID ORAL CONTINUOUS PRN
Status: COMPLETED | OUTPATIENT
Start: 2023-12-27 | End: 2023-12-27

## 2023-12-27 RX ORDER — HYDROMORPHONE HYDROCHLORIDE 2 MG/ML
0.5 INJECTION, SOLUTION INTRAMUSCULAR; INTRAVENOUS; SUBCUTANEOUS EVERY 5 MIN PRN
Status: DISCONTINUED | OUTPATIENT
Start: 2023-12-27 | End: 2023-12-27 | Stop reason: HOSPADM

## 2023-12-27 RX ORDER — FENTANYL CITRATE 50 UG/ML
INJECTION, SOLUTION INTRAMUSCULAR; INTRAVENOUS PRN
Status: DISCONTINUED | OUTPATIENT
Start: 2023-12-27 | End: 2023-12-27 | Stop reason: SDUPTHER

## 2023-12-27 RX ORDER — ONDANSETRON 2 MG/ML
INJECTION INTRAMUSCULAR; INTRAVENOUS PRN
Status: DISCONTINUED | OUTPATIENT
Start: 2023-12-27 | End: 2023-12-27 | Stop reason: SDUPTHER

## 2023-12-27 RX ORDER — HYDROCODONE BITARTRATE AND ACETAMINOPHEN 5; 325 MG/1; MG/1
1 TABLET ORAL EVERY 4 HOURS PRN
Qty: 12 TABLET | Refills: 0 | Status: SHIPPED | OUTPATIENT
Start: 2023-12-27 | End: 2024-01-03

## 2023-12-27 RX ORDER — APREPITANT 40 MG/1
40 CAPSULE ORAL ONCE
Status: COMPLETED | OUTPATIENT
Start: 2023-12-27 | End: 2023-12-27

## 2023-12-27 RX ADMIN — FENTANYL CITRATE 50 MCG: 50 INJECTION, SOLUTION INTRAMUSCULAR; INTRAVENOUS at 10:57

## 2023-12-27 RX ADMIN — SODIUM CHLORIDE, POTASSIUM CHLORIDE, SODIUM LACTATE AND CALCIUM CHLORIDE: 600; 310; 30; 20 INJECTION, SOLUTION INTRAVENOUS at 10:52

## 2023-12-27 RX ADMIN — LIDOCAINE HYDROCHLORIDE 100 MG: 20 INJECTION, SOLUTION EPIDURAL; INFILTRATION; INTRACAUDAL; PERINEURAL at 10:57

## 2023-12-27 RX ADMIN — PHENYLEPHRINE HYDROCHLORIDE 200 MCG: 10 INJECTION INTRAVENOUS at 11:14

## 2023-12-27 RX ADMIN — ONDANSETRON 4 MG: 2 INJECTION INTRAMUSCULAR; INTRAVENOUS at 11:03

## 2023-12-27 RX ADMIN — PHENYLEPHRINE HYDROCHLORIDE 200 MCG: 10 INJECTION INTRAVENOUS at 11:11

## 2023-12-27 RX ADMIN — GLYCOPYRROLATE 0.1 MG: 0.2 INJECTION, SOLUTION INTRAMUSCULAR; INTRAVENOUS at 11:36

## 2023-12-27 RX ADMIN — CEFAZOLIN 2000 MG: 2 INJECTION, POWDER, FOR SOLUTION INTRAMUSCULAR; INTRAVENOUS at 10:46

## 2023-12-27 RX ADMIN — ACETAMINOPHEN 650 MG: 325 TABLET ORAL at 10:01

## 2023-12-27 RX ADMIN — SODIUM CHLORIDE, POTASSIUM CHLORIDE, SODIUM LACTATE AND CALCIUM CHLORIDE: 600; 310; 30; 20 INJECTION, SOLUTION INTRAVENOUS at 10:01

## 2023-12-27 RX ADMIN — PROPOFOL 250 MG: 10 INJECTION, EMULSION INTRAVENOUS at 10:57

## 2023-12-27 RX ADMIN — PHENYLEPHRINE HYDROCHLORIDE 100 MCG: 10 INJECTION INTRAVENOUS at 11:27

## 2023-12-27 RX ADMIN — PHENYLEPHRINE HYDROCHLORIDE 100 MCG: 10 INJECTION INTRAVENOUS at 11:32

## 2023-12-27 RX ADMIN — PHENYLEPHRINE HYDROCHLORIDE 100 MCG: 10 INJECTION INTRAVENOUS at 11:41

## 2023-12-27 RX ADMIN — APREPITANT 40 MG: 40 CAPSULE ORAL at 10:01

## 2023-12-27 NOTE — BRIEF OP NOTE
Brief Postoperative Note      Patient: Kenyon Parsons  YOB: 1955  MRN: [id number]    Date of Procedure: 12/27/2023    Pre-Op Diagnosis Codes:     * Malignant neoplasm of lower-outer quadrant of right female breast, unspecified estrogen receptor status ([address]) [C50.511]    Post-Op Diagnosis: Same       Procedure(s):  POWER PORT-A-CATHETER PLACEMENT    Surgeon(s):  Anca Call MD    Assistant:  Surgical Assistant: Lisa Sharif    Anesthesia: General    Estimated Blood Loss (mL): Minimal    Complications: None    Specimens:   * No specimens in log *    Implants:  Implant Name Type Inv. Item Serial No.  Lot No. LRB No. Used Action   PORT INFUS SGL LUMN ATTCH POLYUR OPN END CATH 8FR POWERPRT - [id number]  PORT INFUS SGL LUMN ATTCH POLYUR OPN END CATH 8FR POWERPRT  [employer] [id number] 1 Implanted         Drains: * No LDAs found *    Findings: Left 8 Fr.  Power port placed      Electronically signed by Graciela Best MD on 12/27/2023 at 11:48 AM

## 2023-12-27 NOTE — DISCHARGE INSTRUCTIONS
Alyx Madden M.D.    [phone]                                                     [address]     [facility] [address] [facility], [address]    Port discharge instructions    Resume regular diet  No driving for 24 hours  May shower  No heavy lifting for 24 hours  May access and use port as needed  Call the office as needed for any post op questions. GENERAL SURGERY DISCHARGE INSTRUCTIONS    Follow your surgeons instructions. Follow up with your surgeon as directed. Observe the operative area for signs of excessive bleeding. If needed apply pressure,elevate if able and contact your surgeon. Observe the operative site for any signs of infection- such as increased pain,redness,fever greater than 101 degrees,swelling, foul odor or drainage. Contact your surgeon if any of these symptoms are present. Keep operative site clean and dry. Do not remove dressing unless instructed to by surgeon. Apply ice as directed. If unable to urinate once you are at home,  notify your surgeon or go to the Emergency Room. Avoid pulling,pushing or tugging to suture line. If you become short of breath call your doctor or go to the ER. Take medications as directed. Pain medication should be taken with food. Do not drive or operate machinery while taking narcotics. For any problems or question call your surgeon. ANESTHESIA DISCHARGE INSTRUCTIONS    Wear your seatbelt home. You are under the influence of drugs-do not drink alcohol, drive, operate machinery, make any important decisions or sign any legal documents for 24 hours. Children should not ride bikes, [place] or play on gym sets for 24 hours after surgery. A responsible adult needs to be with you for 24 hours. You may experience lightheadedness, dizziness, or sleepiness following surgery. Rest at home today- increase activity as tolerated.   Progress

## 2023-12-27 NOTE — PROGRESS NOTES
Pt arrived from OR, report from crna/rn. Pt had left upper chest port a cath placement, surgical incision well approximated with glue, no drainage/ swelling/ bruising noted. Ice pack placed. Pt awakening and responsive upon arrival, respirations even unlabored, simple mask 6 liters in place. VSS.

## 2023-12-27 NOTE — ANESTHESIA POSTPROCEDURE EVALUATION
Department of Anesthesiology  Postprocedure Note    Patient: Ariana Augustin  MRN: [id number]  YOB: 1955  Date of evaluation: 12/27/2023    Procedure Summary       Date: 12/27/23 Room / Location: [address]    Anesthesia Start: 9869 Anesthesia Stop: 1200    Procedure: POWER PORT-A-CATHETER PLACEMENT (Neck) Diagnosis:       Malignant neoplasm of lower-outer quadrant of right female breast, unspecified estrogen receptor status ([address])      (Malignant neoplasm of lower-outer quadrant of right female breast, unspecified estrogen receptor status ([address]) [C50.511])    Surgeons: Lizet Valente MD Responsible Provider: Ke Carbone MD    Anesthesia Type: general ASA Status: 3            Anesthesia Type: No value filed. Denys Phase I: Denys Score: 10    Denys Phase II:      Anesthesia Post Evaluation    Patient location during evaluation: bedside  Patient participation: complete - patient participated  Level of consciousness: awake and alert  Pain score: 2  Airway patency: patent  Nausea & Vomiting: no vomiting  Cardiovascular status: hemodynamically stable  Respiratory status: nonlabored ventilation  Hydration status: stable  Multimodal analgesia pain management approach  Pain management: adequate    No notable events documented.

## 2023-12-27 NOTE — ANESTHESIA PRE PROCEDURE
Social History     Tobacco Use    Smoking status: Never    Smokeless tobacco: Never   Substance Use Topics    Alcohol use: Not on file                                Counseling given: Not Answered      Vital Signs (Current): There were no vitals filed for this visit. BP Readings from Last 3 Encounters:   07/21/22 121/81   12/10/20 120/82   11/25/20 (!) 126/90       NPO Status:                                                                                 BMI:   Wt Readings from Last 3 Encounters:   08/31/23 79.4 kg (175 lb)   11/25/20 88.5 kg (195 lb)     There is no height or weight on file to calculate BMI.    CBC: No results found for: \"WBC\", \"RBC\", \"HGB\", \"HCT\", \"MCV\", \"RDW\", \"PLT\"    CMP: No results found for: \"NA\", \"K\", \"CL\", \"CO2\", \"BUN\", \"CREATININE\", \"GFRAA\", \"AGRATIO\", \"LABGLOM\", \"GLUCOSE\", \"GLU\", \"PROT\", \"CALCIUM\", \"BILITOT\", \"ALKPHOS\", \"AST\", \"ALT\"    POC Tests: No results for input(s): \"POCGLU\", \"POCNA\", \"POCK\", \"POCCL\", \"POCBUN\", \"POCHEMO\", \"POCHCT\" in the last 72 hours.     Coags: No results found for: \"PROTIME\", \"INR\", \"APTT\"    HCG (If Applicable): No results found for: \"PREGTESTUR\", \"PREGSERUM\", \"HCG\", \"HCGQUANT\"     ABGs: No results found for: \"PHART\", \"PO2ART\", \"DZK0SBR\", \"EFQ1TDL\", \"BEART\", \"H5AZARDN\"     Type & Screen (If Applicable):  No results found for: \"LABABO\", \"LABRH\"    Drug/Infectious Status (If Applicable):  No results found for: \"HIV\", \"HEPCAB\"    COVID-19 Screening (If Applicable): No results found for: \"COVID19\"        Anesthesia Evaluation  Patient summary reviewed   no history of anesthetic complications:   Airway: Mallampati: II  TM distance: <3 FB   Neck ROM: full  Mouth opening: > = 3 FB   Dental: normal exam         Pulmonary:Negative Pulmonary ROS and normal exam  breath sounds clear to auscultation  (+)     sleep apnea: on noncompliant,           (-) COPD and asthma                           Cardiovascular:  Exercise tolerance:

## 2023-12-27 NOTE — PROGRESS NOTES
Pt discharged home per MD orders. Pt and family given discharge intrusions and state no questions. Pt peripheral IV removed, intact, bleeding controlled. Pt safely transported off unit with all belongings.

## 2023-12-27 NOTE — PROGRESS NOTES
Pt transferred to [facility], phase 2. Pt awake and alert, VSS. Left chest incision well approximated, no change in incision. Bedside report to [facility].

## 2023-12-27 NOTE — H&P
[facility]        PATIENT NAME: Sherry Osorio      TODAY'S DATE: 12/27/2023    Reason for Consult:  Power port a cath placement    Requesting Physician:  Dr. Gucci Heard:              The patient is a 79 y.o. female who presents with breast cancer. She is in need of long term intravenous access for chemotherapy. The patient has not had prior neck or shoulder surgery. She is not having upper extremity swelling. She has not had prior chest surgery. She has not had a previous central venous catheter placed. Past Medical History:    History reviewed. No pertinent past medical history. Past Surgical History:        Procedure Laterality Date    TEN NEEDLE BIOPSY LYMPH NODE SUPERFICIAL  12/7/2023    TEN NEEDLE BIOPSY LYMPH NODE SUPERFICIAL 12/7/2023 [facility] ULTRASOUND    TEN STEROTACTIC LOC BREAST BIOPSY RIGHT Right 12/8/2023    TEN STEROTACTIC LOC BREAST BIOPSY RIGHT [facility]    US BREAST BIOPSY NEEDLE ADDITIONAL RIGHT Right 12/7/2023    US BREAST BIOPSY NEEDLE ADDITIONAL RIGHT 12/7/2023 [facility] ULTRASOUND    US BREAST BIOPSY W LOC DEVICE 1ST LESION RIGHT Right 12/7/2023    US BREAST BIOPSY W LOC DEVICE 1ST LESION RIGHT 12/7/2023 [facility] ULTRASOUND       Current Medications:   Current Facility-Administered Medications: lactated ringers IV soln infusion, , IntraVENous, Continuous  sodium chloride flush 0.9 % injection 5-40 mL, 5-40 mL, IntraVENous, 2 times per day  sodium chloride flush 0.9 % injection 5-40 mL, 5-40 mL, IntraVENous, PRN  0.9 % sodium chloride infusion, , IntraVENous, PRN  ceFAZolin (ANCEF) 2,000 mg in sodium chloride 0.9 % 50 mL IVPB (mini-bag), 2,000 mg, IntraVENous, On Call to OR  Prior to Admission medications    Medication Sig Start Date End Date Taking?  Authorizing Provider   Levothyroxine Sodium (SYNTHROID PO) Take by mouth   Yes Provider, MD Nancy   fluocinolone (DERMOTIC) 0.01 % OIL oil Place 5

## 2023-12-29 ENCOUNTER — TELEPHONE (OUTPATIENT)
Dept: SURGERY | Age: 68
End: 2023-12-29

## 2023-12-29 NOTE — TELEPHONE ENCOUNTER
Phone call from pt, she stated her port a cath looks a little swollen. No redness , not painful, does not look infected. Advised pt to apply ice on and off and if worsens to go to the ER or call our office to speak with the doctor on call.

## 2024-03-07 ENCOUNTER — HOSPITAL ENCOUNTER (OUTPATIENT)
Dept: GENERAL RADIOLOGY | Age: 69
Discharge: HOME | End: 2024-03-07
Payer: MEDICARE

## 2024-03-07 ENCOUNTER — HOSPITAL ENCOUNTER (OUTPATIENT)
Age: 69
Discharge: HOME | End: 2024-03-07
Payer: MEDICARE

## 2024-03-07 DIAGNOSIS — C50.511 MALIGNANT NEOPLASM OF LOWER-OUTER QUADRANT OF RIGHT FEMALE BREAST, UNSPECIFIED ESTROGEN RECEPTOR STATUS (HCC): ICD-10-CM

## 2024-03-07 PROCEDURE — 71046 X-RAY EXAM CHEST 2 VIEWS: CPT

## 2024-03-24 ENCOUNTER — APPOINTMENT (OUTPATIENT)
Dept: CT IMAGING | Age: 69
DRG: 204 | End: 2024-03-24
Payer: MEDICARE

## 2024-03-24 ENCOUNTER — APPOINTMENT (OUTPATIENT)
Dept: GENERAL RADIOLOGY | Age: 69
DRG: 204 | End: 2024-03-24
Payer: MEDICARE

## 2024-03-24 ENCOUNTER — HOSPITAL ENCOUNTER (INPATIENT)
Age: 69
LOS: 1 days | Discharge: HOME | DRG: 204 | End: 2024-03-26
Attending: STUDENT IN AN ORGANIZED HEALTH CARE EDUCATION/TRAINING PROGRAM | Admitting: INTERNAL MEDICINE
Payer: MEDICARE

## 2024-03-24 DIAGNOSIS — A41.9 SEVERE SEPSIS (HCC): Primary | ICD-10-CM

## 2024-03-24 DIAGNOSIS — J18.9 COMMUNITY ACQUIRED PNEUMONIA, UNSPECIFIED LATERALITY: ICD-10-CM

## 2024-03-24 DIAGNOSIS — R07.81 PLEURITIC CHEST PAIN: ICD-10-CM

## 2024-03-24 DIAGNOSIS — R65.20 SEVERE SEPSIS (HCC): Primary | ICD-10-CM

## 2024-03-24 LAB
ALBUMIN SERPL-MCNC: 3.8 G/DL (ref 3.4–5)
ALBUMIN/GLOB SERPL: 1.5 {RATIO} (ref 1.1–2.2)
ALP SERPL-CCNC: 97 U/L (ref 40–129)
ALT SERPL-CCNC: 16 U/L (ref 10–40)
ANION GAP SERPL CALCULATED.3IONS-SCNC: 14 MMOL/L (ref 3–16)
AST SERPL-CCNC: 24 U/L (ref 15–37)
BASOPHILS # BLD: 0 K/UL (ref 0–0.2)
BASOPHILS NFR BLD: 0.2 %
BILIRUB SERPL-MCNC: <0.2 MG/DL (ref 0–1)
BUN SERPL-MCNC: 6 MG/DL (ref 7–20)
CALCIUM SERPL-MCNC: 8.7 MG/DL (ref 8.3–10.6)
CHLORIDE SERPL-SCNC: 100 MMOL/L (ref 99–110)
CO2 SERPL-SCNC: 26 MMOL/L (ref 21–32)
CREAT SERPL-MCNC: 0.6 MG/DL (ref 0.6–1.2)
D DIMER: 0.82 UG/ML FEU (ref 0–0.6)
DEPRECATED RDW RBC AUTO: 18 % (ref 12.4–15.4)
EOSINOPHIL # BLD: 0 K/UL (ref 0–0.6)
EOSINOPHIL NFR BLD: 0.1 %
GFR SERPLBLD CREATININE-BSD FMLA CKD-EPI: >60 ML/MIN/{1.73_M2}
GLUCOSE SERPL-MCNC: 109 MG/DL (ref 70–99)
HCT VFR BLD AUTO: 32.9 % (ref 36–48)
HGB BLD-MCNC: 10.9 G/DL (ref 12–16)
LACTATE BLDV-SCNC: 1 MMOL/L (ref 0.4–1.9)
LACTATE BLDV-SCNC: 2.2 MMOL/L (ref 0.4–1.9)
LYMPHOCYTES # BLD: 2.1 K/UL (ref 1–5.1)
LYMPHOCYTES NFR BLD: 12.3 %
MCH RBC QN AUTO: 33.3 PG (ref 26–34)
MCHC RBC AUTO-ENTMCNC: 33 G/DL (ref 31–36)
MCV RBC AUTO: 100.8 FL (ref 80–100)
MONOCYTES # BLD: 1.6 K/UL (ref 0–1.3)
MONOCYTES NFR BLD: 9.1 %
NEUTROPHILS # BLD: 13.4 K/UL (ref 1.7–7.7)
NEUTROPHILS NFR BLD: 78.3 %
NT-PROBNP SERPL-MCNC: <36 PG/ML (ref 0–124)
PLATELET # BLD AUTO: 210 K/UL (ref 135–450)
PMV BLD AUTO: 7.5 FL (ref 5–10.5)
POTASSIUM SERPL-SCNC: 3 MMOL/L (ref 3.5–5.1)
PROT SERPL-MCNC: 6.4 G/DL (ref 6.4–8.2)
RBC # BLD AUTO: 3.26 M/UL (ref 4–5.2)
SODIUM SERPL-SCNC: 140 MMOL/L (ref 136–145)
TROPONIN, HIGH SENSITIVITY: 10 NG/L (ref 0–14)
TROPONIN, HIGH SENSITIVITY: 9 NG/L (ref 0–14)
WBC # BLD AUTO: 17.1 K/UL (ref 4–11)

## 2024-03-24 PROCEDURE — 96365 THER/PROPH/DIAG IV INF INIT: CPT

## 2024-03-24 PROCEDURE — 71260 CT THORAX DX C+: CPT

## 2024-03-24 PROCEDURE — 80053 COMPREHEN METABOLIC PANEL: CPT

## 2024-03-24 PROCEDURE — 6360000004 HC RX CONTRAST MEDICATION: Performed by: NURSE PRACTITIONER

## 2024-03-24 PROCEDURE — 93005 ELECTROCARDIOGRAM TRACING: CPT | Performed by: STUDENT IN AN ORGANIZED HEALTH CARE EDUCATION/TRAINING PROGRAM

## 2024-03-24 PROCEDURE — 83880 ASSAY OF NATRIURETIC PEPTIDE: CPT

## 2024-03-24 PROCEDURE — 96366 THER/PROPH/DIAG IV INF ADDON: CPT

## 2024-03-24 PROCEDURE — 85379 FIBRIN DEGRADATION QUANT: CPT

## 2024-03-24 PROCEDURE — 83605 ASSAY OF LACTIC ACID: CPT

## 2024-03-24 PROCEDURE — 85025 COMPLETE CBC W/AUTO DIFF WBC: CPT

## 2024-03-24 PROCEDURE — 96367 TX/PROPH/DG ADDL SEQ IV INF: CPT

## 2024-03-24 PROCEDURE — 99285 EMERGENCY DEPT VISIT HI MDM: CPT

## 2024-03-24 PROCEDURE — 6360000002 HC RX W HCPCS: Performed by: NURSE PRACTITIONER

## 2024-03-24 PROCEDURE — 71045 X-RAY EXAM CHEST 1 VIEW: CPT

## 2024-03-24 PROCEDURE — 96361 HYDRATE IV INFUSION ADD-ON: CPT

## 2024-03-24 PROCEDURE — 83735 ASSAY OF MAGNESIUM: CPT

## 2024-03-24 PROCEDURE — 2580000003 HC RX 258: Performed by: NURSE PRACTITIONER

## 2024-03-24 PROCEDURE — 84484 ASSAY OF TROPONIN QUANT: CPT

## 2024-03-24 RX ORDER — 0.9 % SODIUM CHLORIDE 0.9 %
1000 INTRAVENOUS SOLUTION INTRAVENOUS ONCE
Status: COMPLETED | OUTPATIENT
Start: 2024-03-24 | End: 2024-03-25

## 2024-03-24 RX ORDER — KETOROLAC TROMETHAMINE 15 MG/ML
15 INJECTION, SOLUTION INTRAMUSCULAR; INTRAVENOUS ONCE
Status: COMPLETED | OUTPATIENT
Start: 2024-03-24 | End: 2024-03-25

## 2024-03-24 RX ORDER — AZITHROMYCIN 500 MG/1
500 INJECTION, POWDER, LYOPHILIZED, FOR SOLUTION INTRAVENOUS ONCE
Status: DISCONTINUED | OUTPATIENT
Start: 2024-03-24 | End: 2024-03-24

## 2024-03-24 RX ORDER — POTASSIUM CHLORIDE 20 MEQ/1
40 TABLET, EXTENDED RELEASE ORAL ONCE
Status: DISCONTINUED | OUTPATIENT
Start: 2024-03-24 | End: 2024-03-25

## 2024-03-24 RX ORDER — 0.9 % SODIUM CHLORIDE 0.9 %
1000 INTRAVENOUS SOLUTION INTRAVENOUS ONCE
Status: COMPLETED | OUTPATIENT
Start: 2024-03-24 | End: 2024-03-24

## 2024-03-24 RX ADMIN — IOPAMIDOL 75 ML: 755 INJECTION, SOLUTION INTRAVENOUS at 21:40

## 2024-03-24 RX ADMIN — CEFTRIAXONE SODIUM 1000 MG: 1 INJECTION, POWDER, FOR SOLUTION INTRAMUSCULAR; INTRAVENOUS at 22:10

## 2024-03-24 RX ADMIN — SODIUM CHLORIDE 1000 ML: 9 INJECTION, SOLUTION INTRAVENOUS at 20:34

## 2024-03-24 RX ADMIN — AZITHROMYCIN MONOHYDRATE 500 MG: 500 INJECTION, POWDER, LYOPHILIZED, FOR SOLUTION INTRAVENOUS at 22:55

## 2024-03-24 ASSESSMENT — PAIN SCALES - GENERAL: PAINLEVEL_OUTOF10: 9

## 2024-03-24 ASSESSMENT — PAIN - FUNCTIONAL ASSESSMENT: PAIN_FUNCTIONAL_ASSESSMENT: 0-10

## 2024-03-24 NOTE — ED TRIAGE NOTES
Pt arrives with c/o left sided CP that started last night and is worse with deep breaths. Denies any cardiac hx, currently being treated for breast CA.

## 2024-03-24 NOTE — ED PROVIDER NOTES
[facility] EMERGENCY DEPARTMENT  EMERGENCY DEPARTMENT ENCOUNTER        Pt Name: Tracy Thompson  MRN: [id number]  Birthdate 1955  Date of evaluation: 3/24/2024  Provider: RAFAT Barillas - CNP  PCP: Juan Jacinto PA-C  Note Started: 7:22 PM EDT 3/24/24       I have seen and evaluated this patient with my supervising physician Ciara Cotto MD.      CHIEF COMPLAINT       Chief Complaint   Patient presents with    Chest Pain     Pt arrives with c/o left sided CP that started last night and is worse with deep breaths. Denies any cardiac hx, currently being treated for breast CA.       HISTORY OF PRESENT ILLNESS: 1 or more Elements     History from : Patient and Family daughter    Limitations to history : None    Tracy Thompson is a 68 y.o. female who presents to the emerged department with pleuritic type left-sided chest pain that is present with inspiration and some movement.  Denies injury or trauma.  Currently undergoing chemotherapy for breast cancer, has not yet underwent vasectomy.  Patient does have a left-sided Port-A-Cath.  No rash.  States she has been sick with cough but no fever.  Onset of symptoms yesterday, worsening since time of onset.  Describes the pain as sharp.    Denies any headache, fever, lightheadedness, dizziness, visual disturbances.  No neck or back pain.  No abdominal pain, nausea, vomiting, diarrhea, constipation, or dysuria.  No rash.    Nursing Notes were all reviewed and agreed with or any disagreements were addressed in the HPI.    REVIEW OF SYSTEMS :      Review of Systems   Constitutional:  Negative for activity change, chills and fever.   Respiratory:  Positive for cough. Negative for chest tightness and shortness of breath.    Cardiovascular:  Positive for chest pain.   Gastrointestinal:  Negative for abdominal pain, diarrhea, nausea and vomiting.   Genitourinary:  Negative for dysuria.   All other systems reviewed and are  negative.      Positives and Pertinent negatives as per HPI.     SURGICAL HISTORY     Past Surgical History:   Procedure Laterality Date    TEN NEEDLE BIOPSY LYMPH NODE SUPERFICIAL  12/7/2023    TEN NEEDLE BIOPSY LYMPH NODE SUPERFICIAL 12/7/2023 [facility] ULTRASOUND    TEN STEROTACTIC LOC BREAST BIOPSY RIGHT Right 12/8/2023    TEN STEROTACTIC LOC BREAST BIOPSY RIGHT [facility]    PORT SURGERY N/A 12/27/2023    POWER PORT-A-CATHETER PLACEMENT performed by Romero Sweet MD at [facility] OR    US BREAST BIOPSY NEEDLE ADDITIONAL RIGHT Right 12/7/2023    US BREAST BIOPSY NEEDLE ADDITIONAL RIGHT 12/7/2023 [facility] ULTRASOUND    US BREAST BIOPSY W LOC DEVICE 1ST LESION RIGHT Right 12/7/2023    US BREAST BIOPSY W LOC DEVICE 1ST LESION RIGHT 12/7/2023 [facility] ULTRASOUND       CURRENTMEDICATIONS       Previous Medications    AMLODIPINE (NORVASC) 10 MG TABLET    Take 1 tablet by mouth daily    FLUOCINOLONE (DERMOTIC) 0.01 % OIL OIL    Place 5 drops in the affected ear(s) 2 times daily, for 7 to 14 days, as needed for dermatitis or itching.  If symptoms persists longer than 14 days, call office for appointment.    LEVOTHYROXINE SODIUM (SYNTHROID PO)    Take by mouth    METFORMIN (GLUCOPHAGE) 1000 MG TABLET    Take 1 tablet by mouth 2 times daily (with meals)       ALLERGIES     Corticosteroids    FAMILYHISTORY     History reviewed. No pertinent family history.     SOCIAL HISTORY       Social History     Tobacco Use    Smoking status: Never    Smokeless tobacco: Never   Vaping Use    Vaping Use: Never used   Substance Use Topics    Alcohol use: Not Currently    Drug use: Never       SCREENINGS        Jaquan Coma Scale  Eye Opening: Spontaneous  Best Verbal Response: Oriented  Best Motor Response: Obeys commands  Jaquan Coma Scale Score: 15                CIWA Assessment  BP: 128/72  Pulse: (!) 110           PHYSICAL EXAM  1 or more Elements     ED Triage Vitals [03/24/24 1910]   BP Temp Temp Source Pulse Respirations SpO2

## 2024-03-25 PROBLEM — R07.81 PLEURITIC CHEST PAIN: Status: ACTIVE | Noted: 2024-03-25

## 2024-03-25 PROBLEM — R07.9 CHEST PAIN: Status: ACTIVE | Noted: 2024-03-25

## 2024-03-25 LAB
ANION GAP SERPL CALCULATED.3IONS-SCNC: 7 MMOL/L (ref 3–16)
BASOPHILS # BLD: 0.1 K/UL (ref 0–0.2)
BASOPHILS NFR BLD: 0.8 %
BUN SERPL-MCNC: 6 MG/DL (ref 7–20)
CALCIUM SERPL-MCNC: 8.4 MG/DL (ref 8.3–10.6)
CHLORIDE SERPL-SCNC: 109 MMOL/L (ref 99–110)
CO2 SERPL-SCNC: 27 MMOL/L (ref 21–32)
CREAT SERPL-MCNC: 0.6 MG/DL (ref 0.6–1.2)
CRP SERPL-MCNC: 11.7 MG/L (ref 0–5.1)
DEPRECATED RDW RBC AUTO: 18.2 % (ref 12.4–15.4)
EKG ATRIAL RATE: 114 BPM
EKG DIAGNOSIS: NORMAL
EKG P AXIS: 22 DEGREES
EKG P-R INTERVAL: 128 MS
EKG Q-T INTERVAL: 318 MS
EKG QRS DURATION: 70 MS
EKG QTC CALCULATION (BAZETT): 438 MS
EKG R AXIS: -4 DEGREES
EKG T AXIS: 14 DEGREES
EKG VENTRICULAR RATE: 114 BPM
EOSINOPHIL # BLD: 0 K/UL (ref 0–0.6)
EOSINOPHIL NFR BLD: 0.2 %
ERYTHROCYTE [SEDIMENTATION RATE] IN BLOOD BY WESTERGREN METHOD: 8 MM/HR (ref 0–30)
EST. AVERAGE GLUCOSE BLD GHB EST-MCNC: 111.2 MG/DL
GFR SERPLBLD CREATININE-BSD FMLA CKD-EPI: >90 ML/MIN/{1.73_M2}
GLUCOSE BLD-MCNC: 105 MG/DL (ref 70–99)
GLUCOSE SERPL-MCNC: 105 MG/DL (ref 70–99)
HBA1C MFR BLD: 5.5 %
HCT VFR BLD AUTO: 29.1 % (ref 36–48)
HGB BLD-MCNC: 9.6 G/DL (ref 12–16)
LYMPHOCYTES # BLD: 1.8 K/UL (ref 1–5.1)
LYMPHOCYTES NFR BLD: 15.5 %
MAGNESIUM SERPL-MCNC: 1 MG/DL (ref 1.8–2.4)
MAGNESIUM SERPL-MCNC: 1.7 MG/DL (ref 1.8–2.4)
MCH RBC QN AUTO: 33.3 PG (ref 26–34)
MCHC RBC AUTO-ENTMCNC: 33.1 G/DL (ref 31–36)
MCV RBC AUTO: 100.6 FL (ref 80–100)
MONOCYTES # BLD: 1.3 K/UL (ref 0–1.3)
MONOCYTES NFR BLD: 10.7 %
NEUTROPHILS # BLD: 8.5 K/UL (ref 1.7–7.7)
NEUTROPHILS NFR BLD: 72.8 %
PERFORMED ON: ABNORMAL
PHOSPHATE SERPL-MCNC: 2.5 MG/DL (ref 2.5–4.9)
PLATELET # BLD AUTO: 191 K/UL (ref 135–450)
PMV BLD AUTO: 7.1 FL (ref 5–10.5)
POTASSIUM SERPL-SCNC: 3.8 MMOL/L (ref 3.5–5.1)
RBC # BLD AUTO: 2.9 M/UL (ref 4–5.2)
SODIUM SERPL-SCNC: 143 MMOL/L (ref 136–145)
TSH SERPL DL<=0.005 MIU/L-ACNC: 0.39 UIU/ML (ref 0.27–4.2)
WBC # BLD AUTO: 11.7 K/UL (ref 4–11)

## 2024-03-25 PROCEDURE — 96375 TX/PRO/DX INJ NEW DRUG ADDON: CPT

## 2024-03-25 PROCEDURE — 6360000002 HC RX W HCPCS: Performed by: PHYSICIAN ASSISTANT

## 2024-03-25 PROCEDURE — 93306 TTE W/DOPPLER COMPLETE: CPT

## 2024-03-25 PROCEDURE — 80048 BASIC METABOLIC PNL TOTAL CA: CPT

## 2024-03-25 PROCEDURE — 36415 COLL VENOUS BLD VENIPUNCTURE: CPT

## 2024-03-25 PROCEDURE — 85025 COMPLETE CBC W/AUTO DIFF WBC: CPT

## 2024-03-25 PROCEDURE — 85652 RBC SED RATE AUTOMATED: CPT

## 2024-03-25 PROCEDURE — 6360000002 HC RX W HCPCS: Performed by: INTERNAL MEDICINE

## 2024-03-25 PROCEDURE — 86140 C-REACTIVE PROTEIN: CPT

## 2024-03-25 PROCEDURE — 84100 ASSAY OF PHOSPHORUS: CPT

## 2024-03-25 PROCEDURE — 2580000003 HC RX 258: Performed by: PHYSICIAN ASSISTANT

## 2024-03-25 PROCEDURE — 93010 ELECTROCARDIOGRAM REPORT: CPT | Performed by: INTERNAL MEDICINE

## 2024-03-25 PROCEDURE — 6370000000 HC RX 637 (ALT 250 FOR IP): Performed by: PHYSICIAN ASSISTANT

## 2024-03-25 PROCEDURE — 84443 ASSAY THYROID STIM HORMONE: CPT

## 2024-03-25 PROCEDURE — 83036 HEMOGLOBIN GLYCOSYLATED A1C: CPT

## 2024-03-25 PROCEDURE — 1200000000 HC SEMI PRIVATE

## 2024-03-25 PROCEDURE — 6370000000 HC RX 637 (ALT 250 FOR IP): Performed by: INTERNAL MEDICINE

## 2024-03-25 PROCEDURE — 83735 ASSAY OF MAGNESIUM: CPT

## 2024-03-25 RX ORDER — COLCHICINE 0.6 MG/1
0.6 TABLET ORAL 2 TIMES DAILY
Status: DISCONTINUED | OUTPATIENT
Start: 2024-03-25 | End: 2024-03-26 | Stop reason: HOSPADM

## 2024-03-25 RX ORDER — ATORVASTATIN CALCIUM 20 MG/1
1 TABLET, FILM COATED ORAL DAILY
COMMUNITY
Start: 2022-10-24

## 2024-03-25 RX ORDER — SODIUM CHLORIDE 0.9 % (FLUSH) 0.9 %
5-40 SYRINGE (ML) INJECTION EVERY 12 HOURS SCHEDULED
Status: DISCONTINUED | OUTPATIENT
Start: 2024-03-25 | End: 2024-03-26 | Stop reason: HOSPADM

## 2024-03-25 RX ORDER — CETIRIZINE HYDROCHLORIDE 10 MG/1
10 TABLET ORAL DAILY
Status: DISCONTINUED | OUTPATIENT
Start: 2024-03-25 | End: 2024-03-26 | Stop reason: HOSPADM

## 2024-03-25 RX ORDER — ACETAMINOPHEN 650 MG/1
650 SUPPOSITORY RECTAL EVERY 6 HOURS PRN
Status: DISCONTINUED | OUTPATIENT
Start: 2024-03-25 | End: 2024-03-26 | Stop reason: HOSPADM

## 2024-03-25 RX ORDER — ACETAMINOPHEN 325 MG/1
650 TABLET ORAL EVERY 6 HOURS PRN
Status: DISCONTINUED | OUTPATIENT
Start: 2024-03-25 | End: 2024-03-26 | Stop reason: HOSPADM

## 2024-03-25 RX ORDER — POTASSIUM CHLORIDE 20 MEQ/1
40 TABLET, EXTENDED RELEASE ORAL EVERY 4 HOURS
Status: COMPLETED | OUTPATIENT
Start: 2024-03-25 | End: 2024-03-25

## 2024-03-25 RX ORDER — ENOXAPARIN SODIUM 100 MG/ML
40 INJECTION SUBCUTANEOUS DAILY
Status: DISCONTINUED | OUTPATIENT
Start: 2024-03-25 | End: 2024-03-26 | Stop reason: HOSPADM

## 2024-03-25 RX ORDER — SODIUM CHLORIDE 9 MG/ML
INJECTION, SOLUTION INTRAVENOUS PRN
Status: DISCONTINUED | OUTPATIENT
Start: 2024-03-25 | End: 2024-03-26 | Stop reason: HOSPADM

## 2024-03-25 RX ORDER — LEVOTHYROXINE SODIUM 0.07 MG/1
75 TABLET ORAL DAILY
Status: DISCONTINUED | OUTPATIENT
Start: 2024-03-25 | End: 2024-03-26 | Stop reason: HOSPADM

## 2024-03-25 RX ORDER — KETOROLAC TROMETHAMINE 15 MG/ML
15 INJECTION, SOLUTION INTRAMUSCULAR; INTRAVENOUS EVERY 6 HOURS
Status: DISCONTINUED | OUTPATIENT
Start: 2024-03-25 | End: 2024-03-26 | Stop reason: HOSPADM

## 2024-03-25 RX ORDER — COLCHICINE 0.6 MG/1
1.2 TABLET ORAL ONCE
Status: COMPLETED | OUTPATIENT
Start: 2024-03-25 | End: 2024-03-25

## 2024-03-25 RX ORDER — MAGNESIUM SULFATE 1 G/100ML
1000 INJECTION INTRAVENOUS ONCE
Status: COMPLETED | OUTPATIENT
Start: 2024-03-25 | End: 2024-03-25

## 2024-03-25 RX ORDER — PANTOPRAZOLE SODIUM 40 MG/1
40 TABLET, DELAYED RELEASE ORAL
Status: DISCONTINUED | OUTPATIENT
Start: 2024-03-25 | End: 2024-03-26 | Stop reason: HOSPADM

## 2024-03-25 RX ORDER — MAGNESIUM SULFATE IN WATER 40 MG/ML
2000 INJECTION, SOLUTION INTRAVENOUS PRN
Status: DISCONTINUED | OUTPATIENT
Start: 2024-03-25 | End: 2024-03-26 | Stop reason: HOSPADM

## 2024-03-25 RX ORDER — CETIRIZINE HYDROCHLORIDE 10 MG/1
10 TABLET ORAL 2 TIMES DAILY
COMMUNITY

## 2024-03-25 RX ORDER — INSULIN LISPRO 100 [IU]/ML
0-4 INJECTION, SOLUTION INTRAVENOUS; SUBCUTANEOUS
Status: DISCONTINUED | OUTPATIENT
Start: 2024-03-25 | End: 2024-03-26 | Stop reason: HOSPADM

## 2024-03-25 RX ORDER — INSULIN LISPRO 100 [IU]/ML
0-4 INJECTION, SOLUTION INTRAVENOUS; SUBCUTANEOUS NIGHTLY
Status: DISCONTINUED | OUTPATIENT
Start: 2024-03-25 | End: 2024-03-26 | Stop reason: HOSPADM

## 2024-03-25 RX ORDER — AMLODIPINE BESYLATE 5 MG/1
5 TABLET ORAL DAILY
Status: DISCONTINUED | OUTPATIENT
Start: 2024-03-25 | End: 2024-03-26 | Stop reason: HOSPADM

## 2024-03-25 RX ORDER — LEVOTHYROXINE SODIUM 0.07 MG/1
75 TABLET ORAL DAILY
COMMUNITY
Start: 2024-03-01

## 2024-03-25 RX ORDER — AMLODIPINE BESYLATE 5 MG/1
5 TABLET ORAL DAILY
Status: ON HOLD | COMMUNITY
Start: 2024-02-12 | End: 2024-03-26

## 2024-03-25 RX ORDER — KETOROLAC TROMETHAMINE 15 MG/ML
15 INJECTION, SOLUTION INTRAMUSCULAR; INTRAVENOUS EVERY 6 HOURS PRN
Status: DISCONTINUED | OUTPATIENT
Start: 2024-03-27 | End: 2024-03-26 | Stop reason: HOSPADM

## 2024-03-25 RX ORDER — OMEPRAZOLE 20 MG/1
20 CAPSULE, DELAYED RELEASE ORAL DAILY
COMMUNITY
Start: 2022-10-24

## 2024-03-25 RX ORDER — DEXTROSE MONOHYDRATE 100 MG/ML
INJECTION, SOLUTION INTRAVENOUS CONTINUOUS PRN
Status: DISCONTINUED | OUTPATIENT
Start: 2024-03-25 | End: 2024-03-26 | Stop reason: HOSPADM

## 2024-03-25 RX ORDER — GUAIFENESIN 600 MG/1
600 TABLET, EXTENDED RELEASE ORAL 2 TIMES DAILY
Status: DISCONTINUED | OUTPATIENT
Start: 2024-03-25 | End: 2024-03-26 | Stop reason: HOSPADM

## 2024-03-25 RX ORDER — ONDANSETRON 2 MG/ML
4 INJECTION INTRAMUSCULAR; INTRAVENOUS EVERY 6 HOURS PRN
Status: DISCONTINUED | OUTPATIENT
Start: 2024-03-25 | End: 2024-03-26 | Stop reason: HOSPADM

## 2024-03-25 RX ORDER — ATORVASTATIN CALCIUM 20 MG/1
20 TABLET, FILM COATED ORAL DAILY
Status: DISCONTINUED | OUTPATIENT
Start: 2024-03-25 | End: 2024-03-26 | Stop reason: HOSPADM

## 2024-03-25 RX ORDER — SODIUM CHLORIDE 0.9 % (FLUSH) 0.9 %
5-40 SYRINGE (ML) INJECTION PRN
Status: DISCONTINUED | OUTPATIENT
Start: 2024-03-25 | End: 2024-03-26 | Stop reason: HOSPADM

## 2024-03-25 RX ORDER — GLUCAGON 1 MG/ML
1 KIT INJECTION PRN
Status: DISCONTINUED | OUTPATIENT
Start: 2024-03-25 | End: 2024-03-26 | Stop reason: HOSPADM

## 2024-03-25 RX ORDER — BENZONATATE 100 MG/1
100 CAPSULE ORAL 3 TIMES DAILY PRN
Status: DISCONTINUED | OUTPATIENT
Start: 2024-03-25 | End: 2024-03-26 | Stop reason: HOSPADM

## 2024-03-25 RX ORDER — SENNOSIDES A AND B 8.6 MG/1
1 TABLET, FILM COATED ORAL DAILY PRN
Status: DISCONTINUED | OUTPATIENT
Start: 2024-03-25 | End: 2024-03-26 | Stop reason: HOSPADM

## 2024-03-25 RX ADMIN — SODIUM CHLORIDE 1000 ML: 9 INJECTION, SOLUTION INTRAVENOUS at 00:33

## 2024-03-25 RX ADMIN — SODIUM CHLORIDE, PRESERVATIVE FREE 10 ML: 5 INJECTION INTRAVENOUS at 21:04

## 2024-03-25 RX ADMIN — BENZONATATE 100 MG: 100 CAPSULE ORAL at 15:36

## 2024-03-25 RX ADMIN — SODIUM CHLORIDE, PRESERVATIVE FREE 10 ML: 5 INJECTION INTRAVENOUS at 08:02

## 2024-03-25 RX ADMIN — POTASSIUM CHLORIDE 40 MEQ: 1500 TABLET, EXTENDED RELEASE ORAL at 05:26

## 2024-03-25 RX ADMIN — MAGNESIUM SULFATE HEPTAHYDRATE 1000 MG: 1 INJECTION, SOLUTION INTRAVENOUS at 10:13

## 2024-03-25 RX ADMIN — KETOROLAC TROMETHAMINE 15 MG: 15 INJECTION, SOLUTION INTRAMUSCULAR; INTRAVENOUS at 17:50

## 2024-03-25 RX ADMIN — SODIUM CHLORIDE: 9 INJECTION, SOLUTION INTRAVENOUS at 10:12

## 2024-03-25 RX ADMIN — KETOROLAC TROMETHAMINE 15 MG: 15 INJECTION, SOLUTION INTRAMUSCULAR; INTRAVENOUS at 14:39

## 2024-03-25 RX ADMIN — SODIUM CHLORIDE, PRESERVATIVE FREE 10 ML: 5 INJECTION INTRAVENOUS at 21:03

## 2024-03-25 RX ADMIN — AMLODIPINE BESYLATE 5 MG: 5 TABLET ORAL at 08:02

## 2024-03-25 RX ADMIN — CETIRIZINE HYDROCHLORIDE 10 MG: 10 TABLET, FILM COATED ORAL at 08:02

## 2024-03-25 RX ADMIN — LEVOTHYROXINE SODIUM 75 MCG: 0.07 TABLET ORAL at 05:26

## 2024-03-25 RX ADMIN — MAGNESIUM SULFATE HEPTAHYDRATE 2000 MG: 40 INJECTION, SOLUTION INTRAVENOUS at 02:17

## 2024-03-25 RX ADMIN — ATORVASTATIN CALCIUM 20 MG: 20 TABLET, FILM COATED ORAL at 08:02

## 2024-03-25 RX ADMIN — POTASSIUM CHLORIDE 40 MEQ: 1500 TABLET, EXTENDED RELEASE ORAL at 02:10

## 2024-03-25 RX ADMIN — GUAIFENESIN 600 MG: 600 TABLET, EXTENDED RELEASE ORAL at 15:36

## 2024-03-25 RX ADMIN — KETOROLAC TROMETHAMINE 15 MG: 15 INJECTION, SOLUTION INTRAMUSCULAR; INTRAVENOUS at 00:34

## 2024-03-25 RX ADMIN — PANTOPRAZOLE SODIUM 40 MG: 40 TABLET, DELAYED RELEASE ORAL at 05:26

## 2024-03-25 RX ADMIN — MAGNESIUM SULFATE HEPTAHYDRATE 2000 MG: 40 INJECTION, SOLUTION INTRAVENOUS at 04:26

## 2024-03-25 RX ADMIN — COLCHICINE 0.6 MG: 0.6 TABLET, FILM COATED ORAL at 08:02

## 2024-03-25 RX ADMIN — COLCHICINE 0.6 MG: 0.6 TABLET, FILM COATED ORAL at 20:59

## 2024-03-25 RX ADMIN — COLCHICINE 1.2 MG: 0.6 TABLET, FILM COATED ORAL at 02:11

## 2024-03-25 RX ADMIN — KETOROLAC TROMETHAMINE 15 MG: 15 INJECTION, SOLUTION INTRAMUSCULAR; INTRAVENOUS at 05:14

## 2024-03-25 RX ADMIN — ENOXAPARIN SODIUM 40 MG: 100 INJECTION SUBCUTANEOUS at 08:02

## 2024-03-25 ASSESSMENT — PAIN SCALES - GENERAL
PAINLEVEL_OUTOF10: 9
PAINLEVEL_OUTOF10: 8
PAINLEVEL_OUTOF10: 2
PAINLEVEL_OUTOF10: 0

## 2024-03-25 ASSESSMENT — PAIN DESCRIPTION - LOCATION: LOCATION: HEAD

## 2024-03-25 NOTE — PROGRESS NOTES
Patient has been admitted to . VS have been obtained. Admission/ assessment complete. Patient is being oriented to the room/ floor. Family remains at bedside. All needs/ concerns have been met at this time.

## 2024-03-25 NOTE — ED PROVIDER NOTES
In addition to the advanced practice provider, I personally saw Tracy Thompson and performed a substantive portion of the visit including all aspects of the medical decision making.    Medical Decision Making    Pt with 1 day hx of pleuritic chest pain, with cough. No fever.  She is on chemo for breast cancer and gets neupogen shots  No pain with leaning forward  No peripheral edema  Has 2 sessions of chemo left, follows with Dr. Lozano     EKG  The Ekg interpreted by me in the absence of a cardiologist shows.  Sinus tachycardia with a rate of 114.  Axis normal.  QTc appropriate.  No specific ST or T wave abnormality.  No prior for comparison.    SEP-1  Is this patient to be included in the SEP-1 Core Measure due to severe sepsis or septic shock?   Yes   SEP-1 CORE MEASURE DATA      Sepsis Criteria   Severe Sepsis Criteria   Septic Shock Criteria     Must be confirmed or suspected to move forward with diagnosis of sepsis.    Must meet 2:    [] Temperature > 100.9 F (38.3 C)        or < 96.8 F (36 C)  [] HR > 90  [] RR > 20  [] WBC > 12 or < 4 or 10% bands      AND:      [] Infection Confirmed or        Suspected.     Must meet 1:    [] Lactate > 2       or   [] Signs of Organ Dysfunction:    - SBP < 90 or MAP < 65  - Altered mental status  - Creatinine > 2 or increased from      baseline  - Urine Output < 0.5 ml/kg/hr  - Bilirubin > 2  - INR > 1.5 (not anticoagulated)  - Platelets < 100,000  - Acute Respiratory Failure as     evidenced by new need for NIPPV     or mechanical ventilation      [] No criteria met for Severe Sepsis.   Must meet 1:    [] Lactate > 4        or   [] SBP < 90 or MAP < 65 for at        least two readings in the first        hour after fluid bolus        administration      [] Vasopressors initiated (if hypotension persists after fluid resuscitation)        [] No criteria met for Septic Shock.   Patient Vitals for the past 6 hrs:   BP Temp Pulse Resp SpO2 Height Weight Weight Method  Percent Weight Change   03/24/24 1910 108/65 98.5 °F (36.9 °C) (!) 111 16 99 % 1.626 m (5' 4\") 77.1 kg (170 lb) Actual;Standing scale 0   03/24/24 2030 128/72 -- (!) 110 23 95 % -- -- -- --   03/24/24 2230 109/64 -- (!) 111 29 94 % -- -- -- --   03/25/24 0013 -- -- (!) 110 -- -- -- -- -- --   03/25/24 0014 -- -- (!) 112 -- -- -- -- -- --      Recent Labs     03/24/24 2023   WBC 17.1*   CREATININE 0.6   BILITOT <0.2            Time Severe Sepsis Identified: 2023 when lactate resulted    Fluid Resuscitation Rational: less than 30mL/kg because of concern for fluid overload and patient/patient advocate made an informed decision to decline more aggressive fluid resuscitation      Repeat lactate level: improving    Reassessment Exam:   Not applicable. Patient does not have septic shock.    Screenings     [place] Coma Scale  Eye Opening: Spontaneous  Best Verbal Response: Oriented  Best Motor Response: Obeys commands  Jaquan Coma Scale Score: 15        CT CHEST PULMONARY EMBOLISM W CONTRAST   Preliminary Result   1. No evidence of pulmonary artery embolism.   2. Trace left pleural effusion with basilar atelectasis.   3. Hepatic steatosis.         XR CHEST PORTABLE   Final Result   Minimal left basilar airspace disease most likely representing atelectasis or   pneumonia.           Labs Reviewed   CBC WITH AUTO DIFFERENTIAL - Abnormal; Notable for the following components:       Result Value    WBC 17.1 (*)     RBC 3.26 (*)     Hemoglobin 10.9 (*)     Hematocrit 32.9 (*)     .8 (*)     RDW 18.0 (*)     Neutrophils Absolute 13.4 (*)     Monocytes Absolute 1.6 (*)     All other components within normal limits   COMPREHENSIVE METABOLIC PANEL - Abnormal; Notable for the following components:    Potassium 3.0 (*)     Glucose 109 (*)     BUN 6 (*)     All other components within normal limits   D-DIMER, QUANTITATIVE - Abnormal; Notable for the following components:    D-Dimer, Quant 0.82 (*)     All other

## 2024-03-25 NOTE — CARE COORDINATION
Case Management Note:    Patient admitted as Observation with an anticipated short hospitalization length of stay. Chart reviewed and it appears that patient has minimal needs for discharge at this time. Medical staff to inform case management team if discharge needs are identified.      Case management will continue to follow progress and update discharge plan as needed.     Electronically signed by Augie Grajeda on 3/25/24 at 9:21 AM EDT

## 2024-03-25 NOTE — PROGRESS NOTES
.4 Eyes Skin Assessment     NAME:  Tracy Thompson  YOB: 1955  MEDICAL RECORD NUMBER:  [id number]    The patient is being assess for  Admission    I agree that 2 RN's have performed a thorough Head to Toe Skin Assessment on the patient. ALL assessment sites listed below have been assessed.      Areas assessed by both nurses:    Head, Face, Ears, Shoulders, Back, Chest, Arms, Elbows, Hands, Sacrum. Buttock, Coccyx, Ischium, and Legs. Feet and Heels        Does the Patient have a Wound? No noted wound(s)       Dayday Prevention initiated:  No   Wound Care Orders initiated:  No    Pressure Injury (Stage 3,4, Unstageable, DTI, NWPT, and Complex wounds) if present place consult order under :: No    New and Established Ostomies if present place consult order under : No      Nurse 1 eSignature: Electronically signed by Neela Garcia RN on 3/25/24 at 6:39 AM EDT    **SHARE this note so that the co-signing nurse is able to place an eSignature**    Nurse 2 eSignature: Electronically signed by HAYDE DALTON RN on 3/25/24 at 6:46 AM EDT

## 2024-03-25 NOTE — PROGRESS NOTES
HOSPITALISTS PROGRESS NOTE    3/25/2024 9:55 AM        Name: Tracy Thompson .              Admitted: 3/24/2024  Primary Care Provider: Juan Jacinto PA-C (Tel: [phone])      Brief Course: 68-year-old female with PMHx of breast cancer; on chemotherapy(last chemo was on 3/13 and she received Neupogen on 3/14) who presented with left-sided chest pain      Interval history:   Pt seen and examined today   Overnight events noted and interval ancillary notes reviewed.    Low mag this morning; replacement ordered.  Check mag level after replacement  Pt reported cough but denied any fevers, chills, chest pain,SOB, nausea vomiting or abdominal pain      Assessment & Plan:     Chest pain; atypical  Hs troponin negative x 2.  EKG showed sinus tachycardia and nonspecific T wave abnormalities  Monitor on telemetry    CT chest showed trace pericardial thickening anteriorly vs pericardial fluid.  Echo ordered     Hypokalemia; resolved.  Monitor potassium levels and replace as needed    Hypomagnesemia; replacement ordered.  Monitor mag level closely and replace as needed     Leukocytosis; improved.  WBC down to 11.7  Patient afebrile. No obvious s/s of infection     Hx breast cancer; - Currently undergoing chemo.  Last chemo 3/13  Hematology/Oncology consulted; appreciate their input    Hypertension; continue Norvasc and monitor BP closely    Hyperlipidemia; continue statins     Hypothyroidism; continue Synthroid     Diabetes mellitus; HgbA1c ordered.  Continue SSI and monitor blood glucose closely    DVT PPX: Lovenox  Code:Full Code    Disposition: Once acute medical issues have resolved    Current Medications  amLODIPine (NORVASC) tablet 5 mg, Daily  atorvastatin (LIPITOR) tablet 20 mg, Daily  cetirizine (ZYRTEC) tablet 10 mg, Daily  levothyroxine (SYNTHROID) tablet 75 mcg, Daily  pantoprazole (PROTONIX) tablet 40 mg, QAM AC  perflutren lipid microspheres

## 2024-03-25 NOTE — ED NOTES
ED TO INPATIENT SBAR HANDOFF    Patient Name: Tracy Thompson   :  1955  68 y.o.   MRN:  [id number]  Preferred Name  Tracy  ED Room #:  ED-0011/11  Family/Caregiver Present yes   Restraints no   Sitter no   Sepsis Risk Score Sepsis Risk Score: 3.62    Situation  Code Status: Full Code No additional code details.    Allergies: Corticosteroids  Weight: Patient Vitals for the past 96 hrs (Last 3 readings):   Weight   24 191 77.1 kg (170 lb)     Arrived from: home  Chief Complaint:   Chief Complaint   Patient presents with    Chest Pain     Pt arrives with c/o left sided CP that started last night and is worse with deep breaths. Denies any cardiac hx, currently being treated for breast CA.     Hospital Problem/Diagnosis:  Principal Problem:    Pleuritic chest pain  Resolved Problems:    * No resolved hospital problems. *    Imaging:   CT CHEST PULMONARY EMBOLISM W CONTRAST   Preliminary Result   1. No evidence of pulmonary artery embolism.   2. Trace left pleural effusion with basilar atelectasis.   3. Hepatic steatosis.         XR CHEST PORTABLE   Final Result   Minimal left basilar airspace disease most likely representing atelectasis or   pneumonia.           Abnormal labs:   Abnormal Labs Reviewed   CBC WITH AUTO DIFFERENTIAL - Abnormal; Notable for the following components:       Result Value    WBC 17.1 (*)     RBC 3.26 (*)     Hemoglobin 10.9 (*)     Hematocrit 32.9 (*)     .8 (*)     RDW 18.0 (*)     Neutrophils Absolute 13.4 (*)     Monocytes Absolute 1.6 (*)     All other components within normal limits   COMPREHENSIVE METABOLIC PANEL - Abnormal; Notable for the following components:    Potassium 3.0 (*)     Glucose 109 (*)     BUN 6 (*)     All other components within normal limits   D-DIMER, QUANTITATIVE - Abnormal; Notable for the following components:    D-Dimer, Quant 0.82 (*)     All other components within normal limits   LACTATE, SEPSIS - Abnormal; Notable for the following

## 2024-03-25 NOTE — H&P
Hospital Medicine History & Physical        Name:  Tracy Thompson /Age/Sex: 1955  (68 y.o. female)   MRN & CSN:  [id number] & [id number] Encounter Date/Time: 3/24/2024 11:10 PM EDT   Location:  ED-001 PCP: Juan Jacinto PA-C         CHIEF COMPLAINT:   Chief Complaint   Patient presents with    Chest Pain     Pt arrives with c/o left sided CP that started last night and is worse with deep breaths. Denies any cardiac hx, currently being treated for breast CA.         HISTORY OF PRESENT ILLNESS:      History from: patient  Limitations to history : None     Tracy Thompson is a 68 y.o. female who presented to the ED for evaluation of sharp, left sided chest pain that began about a day and a half ago and has progressively worsened.  She reports pain is worse with inspiration and coughing.  She reports she has a nonproductive cough.  She denies fever, dizziness, shortness of breath, edema, abdominal pain, nausea, vomiting, diarrhea, constipation, urinary symptoms.  She denies any injury or trauma.  She is currently undergoing chemo for breast cancer.  She has not yet underwent mastectomy.  She reports her last chemo was on 3/13 and she received Neupogen on 3/14. She has a left sided Port-A-Cath. She denies any other complaints or concerns at this time.    REVIEW OF SYSTEMS:   Pertinent positives as noted in the HPI. All other systems reviewed and negative.      PHYSICAL EXAM PERFORMED:  /72   Pulse (!) 110   Temp 98.5 °F (36.9 °C) (Oral)   Resp 23   Ht 1.626 m (5' 4\")   Wt 77.1 kg (170 lb)   SpO2 95%   BMI 29.18 kg/m²     General appearance:  Awake, alert, no apparent distress  HEENT:  Normocephalic, atraumatic without obvious deformity. PERRL. EOM intact. Conjunctivae/corneas clear.  Neck: Supple, with full range of motion. No JVD. Trachea midline.  Respiratory:  Clear to auscultation bilaterally without rales, wheezes, or rhonchi. Normal respiratory effort.   Cardiovascular:

## 2024-03-26 VITALS
HEART RATE: 86 BPM | WEIGHT: 169.75 LBS | OXYGEN SATURATION: 96 % | RESPIRATION RATE: 16 BRPM | SYSTOLIC BLOOD PRESSURE: 98 MMHG | HEIGHT: 64 IN | BODY MASS INDEX: 28.98 KG/M2 | TEMPERATURE: 98.1 F | DIASTOLIC BLOOD PRESSURE: 63 MMHG

## 2024-03-26 LAB
ANION GAP SERPL CALCULATED.3IONS-SCNC: 7 MMOL/L (ref 3–16)
BASOPHILS # BLD: 0 K/UL (ref 0–0.2)
BASOPHILS NFR BLD: 0.4 %
BUN SERPL-MCNC: 4 MG/DL (ref 7–20)
CALCIUM SERPL-MCNC: 8.3 MG/DL (ref 8.3–10.6)
CHLORIDE SERPL-SCNC: 110 MMOL/L (ref 99–110)
CO2 SERPL-SCNC: 24 MMOL/L (ref 21–32)
CREAT SERPL-MCNC: <0.5 MG/DL (ref 0.6–1.2)
DEPRECATED RDW RBC AUTO: 18.4 % (ref 12.4–15.4)
EOSINOPHIL # BLD: 0.1 K/UL (ref 0–0.6)
EOSINOPHIL NFR BLD: 1.2 %
GFR SERPLBLD CREATININE-BSD FMLA CKD-EPI: >90 ML/MIN/{1.73_M2}
GLUCOSE SERPL-MCNC: 109 MG/DL (ref 70–99)
HCT VFR BLD AUTO: 30.9 % (ref 36–48)
HGB BLD-MCNC: 10.7 G/DL (ref 12–16)
LYMPHOCYTES # BLD: 1.3 K/UL (ref 1–5.1)
LYMPHOCYTES NFR BLD: 19.4 %
MCH RBC QN AUTO: 34.8 PG (ref 26–34)
MCHC RBC AUTO-ENTMCNC: 34.5 G/DL (ref 31–36)
MCV RBC AUTO: 100.9 FL (ref 80–100)
MONOCYTES # BLD: 0.7 K/UL (ref 0–1.3)
MONOCYTES NFR BLD: 11.3 %
NEUTROPHILS # BLD: 4.5 K/UL (ref 1.7–7.7)
NEUTROPHILS NFR BLD: 67.7 %
PLATELET # BLD AUTO: 200 K/UL (ref 135–450)
PMV BLD AUTO: 7.3 FL (ref 5–10.5)
POTASSIUM SERPL-SCNC: 3.8 MMOL/L (ref 3.5–5.1)
RBC # BLD AUTO: 3.06 M/UL (ref 4–5.2)
SODIUM SERPL-SCNC: 141 MMOL/L (ref 136–145)
WBC # BLD AUTO: 6.6 K/UL (ref 4–11)

## 2024-03-26 PROCEDURE — 6360000002 HC RX W HCPCS: Performed by: PHYSICIAN ASSISTANT

## 2024-03-26 PROCEDURE — 85025 COMPLETE CBC W/AUTO DIFF WBC: CPT

## 2024-03-26 PROCEDURE — 80048 BASIC METABOLIC PNL TOTAL CA: CPT

## 2024-03-26 PROCEDURE — 6370000000 HC RX 637 (ALT 250 FOR IP): Performed by: INTERNAL MEDICINE

## 2024-03-26 PROCEDURE — 2580000003 HC RX 258: Performed by: PHYSICIAN ASSISTANT

## 2024-03-26 PROCEDURE — 36415 COLL VENOUS BLD VENIPUNCTURE: CPT

## 2024-03-26 PROCEDURE — 6370000000 HC RX 637 (ALT 250 FOR IP): Performed by: PHYSICIAN ASSISTANT

## 2024-03-26 RX ORDER — AMLODIPINE BESYLATE 5 MG/1
5 TABLET ORAL DAILY
Qty: 30 TABLET | Refills: 3 | Status: SHIPPED | OUTPATIENT
Start: 2024-03-26

## 2024-03-26 RX ORDER — ACETAMINOPHEN 325 MG/1
650 TABLET ORAL EVERY 8 HOURS PRN
Qty: 42 TABLET | Refills: 0 | Status: SHIPPED | OUTPATIENT
Start: 2024-03-26 | End: 2024-04-02

## 2024-03-26 RX ADMIN — KETOROLAC TROMETHAMINE 15 MG: 15 INJECTION, SOLUTION INTRAMUSCULAR; INTRAVENOUS at 11:37

## 2024-03-26 RX ADMIN — AMLODIPINE BESYLATE 5 MG: 5 TABLET ORAL at 09:46

## 2024-03-26 RX ADMIN — SODIUM CHLORIDE, PRESERVATIVE FREE 10 ML: 5 INJECTION INTRAVENOUS at 09:46

## 2024-03-26 RX ADMIN — KETOROLAC TROMETHAMINE 15 MG: 15 INJECTION, SOLUTION INTRAMUSCULAR; INTRAVENOUS at 05:51

## 2024-03-26 RX ADMIN — CETIRIZINE HYDROCHLORIDE 10 MG: 10 TABLET, FILM COATED ORAL at 09:46

## 2024-03-26 RX ADMIN — KETOROLAC TROMETHAMINE 15 MG: 15 INJECTION, SOLUTION INTRAMUSCULAR; INTRAVENOUS at 00:14

## 2024-03-26 RX ADMIN — GUAIFENESIN 600 MG: 600 TABLET, EXTENDED RELEASE ORAL at 09:45

## 2024-03-26 RX ADMIN — ATORVASTATIN CALCIUM 20 MG: 20 TABLET, FILM COATED ORAL at 09:46

## 2024-03-26 RX ADMIN — LEVOTHYROXINE SODIUM 75 MCG: 0.07 TABLET ORAL at 05:52

## 2024-03-26 RX ADMIN — ENOXAPARIN SODIUM 40 MG: 100 INJECTION SUBCUTANEOUS at 09:46

## 2024-03-26 RX ADMIN — COLCHICINE 0.6 MG: 0.6 TABLET, FILM COATED ORAL at 09:46

## 2024-03-26 RX ADMIN — SODIUM CHLORIDE, PRESERVATIVE FREE 10 ML: 5 INJECTION INTRAVENOUS at 00:14

## 2024-03-26 RX ADMIN — PANTOPRAZOLE SODIUM 40 MG: 40 TABLET, DELAYED RELEASE ORAL at 05:52

## 2024-03-26 RX ADMIN — BENZONATATE 100 MG: 100 CAPSULE ORAL at 04:13

## 2024-03-26 RX ADMIN — ACETAMINOPHEN 650 MG: 325 TABLET ORAL at 04:13

## 2024-03-26 ASSESSMENT — PAIN SCALES - GENERAL
PAINLEVEL_OUTOF10: 0
PAINLEVEL_OUTOF10: 3
PAINLEVEL_OUTOF10: 3
PAINLEVEL_OUTOF10: 5

## 2024-03-26 NOTE — CONSULTS
ONCOLOGY HEMATOLOGY CARE PROGRESS NOTE      SUBJECTIVE:    Patient known to me.  Currently she is on neoadjuvant systemic therapy for early-stage HER2 positive breast cancer.  Cycle #4 was administered on 3/6/2024.  She received Neulasta on 3/7/2024.    Now admitted with left-sided pleuritic chest pain.  CT angio did not show any evidence of pulmonary embolism.  There was no evidence of pneumonia    The pain is getting better.  No fever or chills  No night sweats  No nausea vomiting  No abdominal pain    ROS:         OBJECTIVE        Physical    VITALS:  Patient Vitals for the past 24 hrs:   BP Temp Temp src Pulse Resp SpO2 Weight   03/25/24 2000 113/73 97.9 °F (36.6 °C) Oral 98 16 97 % --   03/25/24 1614 97/68 97.5 °F (36.4 °C) Oral 94 18 96 % --   03/25/24 1114 107/72 -- -- -- -- -- --   03/25/24 1100 (!) 85/59 98.1 °F (36.7 °C) Oral 98 18 96 % --   03/25/24 0745 94/61 98.1 °F (36.7 °C) Oral 96 18 93 % --   03/25/24 0430 -- -- -- -- -- -- 77.1 kg (170 lb)   03/25/24 0425 (!) 86/60 98.1 °F (36.7 °C) Oral 99 18 94 % --   03/25/24 0145 103/70 98.2 °F (36.8 °C) Oral 97 18 94 % --   03/25/24 0049 -- -- -- (!) 107 -- -- --   03/25/24 0014 -- -- -- (!) 112 -- -- --   03/25/24 0013 -- -- -- (!) 110 -- -- --   03/24/24 2230 109/64 -- -- (!) 111 29 94 % --       24HR INTAKE/OUTPUT:    Intake/Output Summary (Last 24 hours) at 3/25/2024 2153  Last data filed at 3/25/2024 1558  Gross per 24 hour   Intake 480 ml   Output --   Net 480 ml     Conscious alert and oriented.  Mild pallor is present  No icterus  Neck is supple  Lungs are clear to auscultation no rales or rhonchi heard  Respiratory efforts are normal  Abdomen is soft bowel sounds are heard no organomegaly noted  Extremities no edema      DATA:  CBC:    Recent Labs     03/25/24  0431 03/24/24 2023   WBC 11.7* 17.1*   NEUTROABS 8.5* 13.4*   LYMPHOPCT 15.5 12.3   RBC 2.90* 3.26*   HGB 9.6* 10.9*   HCT 29.1* 32.9*   .6* 100.8*  MD Blake  Please contact through [employer]

## 2024-03-26 NOTE — DISCHARGE INSTRUCTIONS
Follow up with your PCP within 5-7 days of discharge.  Have PCP check BP and adjust medication dose if needed   Take all your medications as prescribed.

## 2024-03-26 NOTE — PROGRESS NOTES
Jason ROCHE notified of port unable to give blood return . No new orders at this time.Will be Lab collect this morning.

## 2024-03-26 NOTE — PROGRESS NOTES
Discharge instructions and education given to patient. Meds and follow-up appointment instructions given. Fluids stopped and IV removed without complications. Tele removed from patient. In stable condition, VSS. Pt discharged to their personal car. All questions addressed. Pt discharged.

## 2024-03-26 NOTE — PROGRESS NOTES
Nutrition Note    RECOMMENDATIONS  PO Diet: CCC  ONS: N/A     NUTRITION ASSESSMENT   Pt reports decreased appetite usually occurs during initiation of chemotherapy.  States usual intake & appetite are good, with % of meals consumed thus far.  Family also provides food from outside.  Wt is trending down, with 11 lb loss in past 3 months, which is not considered significant at 6%.  Recommend ONS when not feeling well d/t chemo.  Pt declines need for supplements now.  Willmonitor for further needs as pt is at low risk for nutrition compromise @ this time.     Nutrition Related Findings: No edema noted; gluc 109  Wounds: None  Nutrition Education:  Education not indicated   Nutrition Goals: PO intake 50% or greater     MALNUTRITION ASSESSMENT   Acute Illness  Malnutrition Status: No malnutrition    NUTRITION DIAGNOSIS   No nutrition diagnosis at this time     CURRENT NUTRITION THERAPIES  ADULT DIET; Regular; 4 carb choices (60 gm/meal)     PO Intake: %   PO Supplement Intake:None Ordered    ANTHROPOMETRICS  Current Height: 162.6 cm (5' 4\")  Current Weight - Scale: 77 kg (169 lb 12.1 oz)    Ideal Body Weight (IBW): 120 lbs  (55 kg)    Usual Bodyweight 81.6 kg (180 lb) (x 3 months ago)       BMI: 29    The patient will be monitored per nutrition standards of care. Consult dietitian if additional nutrition interventions are needed prior to RD reassessment.     Brenda Alexis, ELVIRA, LD    Contact: [phone]

## 2024-03-26 NOTE — DISCHARGE SUMMARY
Hospital Medicine Discharge Summary    Patient ID: Tracy Thompson      Patient's PCP: Juan Jacinto PA-C    Admit Date: 3/24/2024     Discharge Date: 3/26/2024     Admitting Physician: Tina Buchanan MD     Discharge Physician: TINA BUCHANAN MD     Hospital Course: This 68-year-old female with PMHx of breast cancer; on chemotherapy(last chemo was on 3/13 and she received Neupogen on 3/14) who presented with left-sided chest pain       Chest pain; atypical.  Resolved  Hs troponin negative x 2.  EKG showed sinus tachycardia and nonspecific T wave abnormalities  CT chest showed trace pericardial thickening anteriorly vs pericardial fluid.  Echo obtained showed EF of 55 to 60%.  No RWMA noted.  Grade 1 DD.  No pericardial effusion noted     Hypokalemia and hypomagnesemia; resolved.         Leukocytosis; resolved  Likely secondary to Neupogen.  Patient afebrile. No obvious s/s of infection     Hx breast cancer; - Currently undergoing chemo.  Last chemo 3/13  Hematology/Oncology consulted; appreciate their input     Hypertension; continue Norvasc and monitor BP closely     Hyperlipidemia; continue statins     Hypothyroidism; continue Synthroid     Diabetes mellitus; HgbA1c 5.5 on 3/25/2024.  Continue current regimen monitor BG closely      Physical Exam Performed:     BP 98/63   Pulse 86   Temp 98.1 °F (36.7 °C) (Oral)   Resp 16   Ht 1.626 m (5' 4\")   Wt 77 kg (169 lb 12.1 oz)   SpO2 96%   BMI 29.14 kg/m²     General appearance: No apparent distress, appears stated age and cooperative.  Eyes: Sclera clear. Pupils equal.  ENT: Moist oral mucosa. Trachea midline, no adenopathy.  Cardiovascular: Regular rhythm, normal S1, S2. No murmur.   Respiratory: Clear to auscultation bilaterally, no wheeze or crackles.   GI: Abdomen soft, no tenderness, not distended, normal bowel sounds  Musculoskeletal:  No cyanosis in digits.  No BLE edema present  Neurology: CN 2-12 grossly intact. No speech or motor

## 2024-03-29 NOTE — PROGRESS NOTES
Physician Progress Note      PATIENT:               AGUEDA BANEGAS  CSN #:                  [id number]  :                       1955  ADMIT DATE:       3/24/2024 7:02 PM  DISCH DATE:        3/26/2024 1:00 PM  RESPONDING  PROVIDER #:        Tina Wick MD          QUERY TEXT:    Patient admitted with pleuritic chest pain. Documentation reflects treatment   of pericarditis with colchicine and toradol.  If possible, please document in   the progress notes and discharge summary if pericarditis was:    The medical record reflects the following:  Risk Factors: 69yo with cough and breast cancer    Clinical Indicators:  ED, \"pleuritic type left-sided chest pain that is present with inspiration and   some movement\"  HP, \"Will treat empirically for pericarditis with colchicine and toradol   pending Echo results\"  Hem/Onc, \"Left-sided pleuritic chest pain...Empiric treatment for pericarditis   per primary team\"  DCS \"Echo obtained showed EF of 55 to 60%.  No RWMA noted.  Grade 1 DD.  No   pericardial effusion noted\"    Treatment: Colchicine, Toradol, Echo  Options provided:  -- pericarditis confirmed after study  -- pericarditis ruled out after study, pleuritic chest pain only  -- Other - I will add my own diagnosis  -- Disagree - Not applicable / Not valid  -- Disagree - Clinically unable to determine / Unknown  -- Refer to Clinical Documentation Reviewer    PROVIDER RESPONSE TEXT:    pericarditis ruled out after study, pleuritic chest pain only.    Query created by: Mandi Conte on 3/29/2024 7:39 AM      Electronically signed by:  Tina Wick MD 3/29/2024 12:23 PM

## 2024-04-17 ENCOUNTER — TELEPHONE (OUTPATIENT)
Dept: SURGERY | Age: 69
End: 2024-04-17

## 2024-04-17 NOTE — TELEPHONE ENCOUNTER
Left a message to return my call. Patient of Dr. CALDERON 's needs to be set up for a cancer consult/ NP form needs to be completed. Please send the call to me when she calls.       Thanks, Tangela

## 2024-04-22 ENCOUNTER — INITIAL CONSULT (OUTPATIENT)
Dept: SURGERY | Age: 69
End: 2024-04-22

## 2024-04-22 VITALS
HEART RATE: 115 BPM | RESPIRATION RATE: 18 BRPM | WEIGHT: 155 LBS | DIASTOLIC BLOOD PRESSURE: 55 MMHG | OXYGEN SATURATION: 98 % | HEIGHT: 64 IN | SYSTOLIC BLOOD PRESSURE: 96 MMHG | BODY MASS INDEX: 26.46 KG/M2

## 2024-04-22 DIAGNOSIS — C50.911 PRIMARY BREAST MALIGNANCY, RIGHT (HCC): Primary | ICD-10-CM

## 2024-04-22 NOTE — PROGRESS NOTES
guidance. We discussed the aspects of breast conservation including the need for negative margins.We discussed the risk of up to a 15-20% chance of having a positive margin and that this would in fact lead to additional surgery. We discussed marking the surgical cavity for potential future radiation. We also discussed the option of a mastectomy. We discussed that although we remove the breast in this procedure, there is still a risk of recurrent disease and reviewed expectations on residual breast tissue. We discussed margins.  We reviewed possible hospitalization and the need for surgical drains. We discussed additional risk and benefits of surgery which include but are not limited to anesthesia related risks, bleeding, range of motion difficulty, chronic pain and or numbness, infection (<3%), wound complications and unappealing cosmetics. We discussed the risk of contralateral breast cancer. We also discussed the possibility of future recurrences. We reviewed expectations for recovery.    Given the overall extent of disease based on her original imaging and multiple foci she would like to move forward with a mastectomy.  She is interested in bilateral mastectomy.    We discussed the role of a sentinel node biopsy after completion of chemotherapy.  I described the procedure of both an injection of radioisotope as well as blue dye with migration to the axilla.  I discussed side effects including discoloration of the urine, stool, and breast as well as the <10% possibility that the axilla would not map.  We discussed the potential need for further surgery.  We reviewed the 3-5% risk of lymphedema.  We discussed additional risks such as permanent arm numbness, the potential for nerve injury, range of motion limitations and the possibility of a false negative finding (<10% based on ACOSOG  data and criteria).  I made it quite clear that depending on her response to chemotherapy we may not need to proceed with an

## 2024-04-23 DIAGNOSIS — C50.911 PRIMARY BREAST MALIGNANCY, RIGHT (HCC): Primary | ICD-10-CM

## 2024-04-23 DIAGNOSIS — Z17.1 MALIGNANT NEOPLASM OF LOWER-OUTER QUADRANT OF RIGHT BREAST OF FEMALE, ESTROGEN RECEPTOR NEGATIVE (HCC): ICD-10-CM

## 2024-04-23 DIAGNOSIS — C50.511 MALIGNANT NEOPLASM OF LOWER-OUTER QUADRANT OF RIGHT BREAST OF FEMALE, ESTROGEN RECEPTOR NEGATIVE (HCC): ICD-10-CM

## 2024-05-01 ENCOUNTER — TELEPHONE (OUTPATIENT)
Dept: WOMENS IMAGING | Age: 69
End: 2024-05-01

## 2024-05-08 ENCOUNTER — TELEPHONE (OUTPATIENT)
Dept: BREAST CENTER | Age: 69
End: 2024-05-08

## 2024-05-08 DIAGNOSIS — C50.911 PRIMARY BREAST MALIGNANCY, RIGHT (HCC): Primary | ICD-10-CM

## 2024-05-08 NOTE — TELEPHONE ENCOUNTER
Patient had her end chemo imaging @ [facility]. I put in an order for  interpretation of outside imaging. Notified FF NN of order. Sent a request to [facility]/ Nely to push imaging and reports to PACS- [facility].     Thanks, Tangela

## 2024-05-08 NOTE — TELEPHONE ENCOUNTER
Patient called in after consult with Dr. Ferrell. Patient was unsure if she should be reaching out or if clinical staff would be reaching out to her regarding surgery.   Please advise

## 2024-05-14 ENCOUNTER — HOSPITAL ENCOUNTER (OUTPATIENT)
Dept: WOMENS IMAGING | Age: 69
Discharge: HOME | End: 2024-05-14
Attending: SURGERY

## 2024-05-14 DIAGNOSIS — C50.911 PRIMARY BREAST MALIGNANCY, RIGHT (HCC): ICD-10-CM

## 2024-05-14 NOTE — TELEPHONE ENCOUNTER
Patient called back about getting scheduled for surgery and speaking with the nurse.     Patient can be reached @[phone].

## 2024-05-15 NOTE — TELEPHONE ENCOUNTER
Outside imaging are still under review. I reached out to Tracy to notify her .       Thanks,Tangela

## 2024-05-21 ENCOUNTER — TELEPHONE (OUTPATIENT)
Dept: SURGERY | Age: 69
End: 2024-05-21

## 2024-05-21 NOTE — TELEPHONE ENCOUNTER
LMOR for patient to return call to the office. There is a note from Dr Ferrell in the result notes. Has patient seen Dr Joiner ? When is/ was her last chemo date?

## 2024-05-21 NOTE — TELEPHONE ENCOUNTER
PT called inquiring about if she needed an appointment or what was next.  She was super concerned about not being called and very worrisome.  Please Advise   Tracy  [phone]

## 2024-05-22 NOTE — TELEPHONE ENCOUNTER
Patient returned call asking to speak with Jennifer. Let the patient know that the nurses or Jennifer would be reaching back out to her.

## 2024-05-22 NOTE — TELEPHONE ENCOUNTER
Stacey called the patient and left a VM ( documented under result note). Please send to Stacey when she calls

## 2024-05-23 NOTE — TELEPHONE ENCOUNTER
Patient returned call and stating the following:    She met with Dr. Moreau 5/21- he says he can't do anything until we make a decision  She states she had finished chemo before her initial consult with Dr. Ferrell  She Doesn't know anything about Rhys or a lympha procedure    She's very frustrated and doesn't think we are trying to care for her. I think she may need to come in for a face to face or something.

## 2024-05-24 ENCOUNTER — HOSPITAL ENCOUNTER (EMERGENCY)
Age: 69
Discharge: HOME | End: 2024-05-24
Attending: EMERGENCY MEDICINE
Payer: MEDICARE

## 2024-05-24 ENCOUNTER — APPOINTMENT (OUTPATIENT)
Dept: CT IMAGING | Age: 69
End: 2024-05-24
Payer: MEDICARE

## 2024-05-24 ENCOUNTER — APPOINTMENT (OUTPATIENT)
Dept: GENERAL RADIOLOGY | Age: 69
End: 2024-05-24
Payer: MEDICARE

## 2024-05-24 VITALS
OXYGEN SATURATION: 99 % | SYSTOLIC BLOOD PRESSURE: 107 MMHG | BODY MASS INDEX: 25.75 KG/M2 | HEART RATE: 88 BPM | DIASTOLIC BLOOD PRESSURE: 71 MMHG | WEIGHT: 150 LBS | TEMPERATURE: 97.8 F | RESPIRATION RATE: 18 BRPM

## 2024-05-24 DIAGNOSIS — R52 GENERALIZED PAIN: Primary | ICD-10-CM

## 2024-05-24 DIAGNOSIS — R91.8 PULMONARY NODULES: ICD-10-CM

## 2024-05-24 LAB
ALBUMIN SERPL-MCNC: 3.3 G/DL (ref 3.4–5)
ALBUMIN/GLOB SERPL: 0.8 {RATIO} (ref 1.1–2.2)
ALP SERPL-CCNC: 80 U/L (ref 40–129)
ALT SERPL-CCNC: 7 U/L (ref 10–40)
ANION GAP SERPL CALCULATED.3IONS-SCNC: 13 MMOL/L (ref 3–16)
APTT BLD: 29.1 SEC (ref 22.1–36.4)
AST SERPL-CCNC: 16 U/L (ref 15–37)
BACTERIA URNS QL MICRO: NORMAL /HPF
BASOPHILS # BLD: 0 K/UL (ref 0–0.2)
BASOPHILS NFR BLD: 0.2 %
BILIRUB SERPL-MCNC: 0.3 MG/DL (ref 0–1)
BILIRUB UR QL STRIP.AUTO: NEGATIVE
BUN SERPL-MCNC: 9 MG/DL (ref 7–20)
CALCIUM SERPL-MCNC: 9.1 MG/DL (ref 8.3–10.6)
CHLORIDE SERPL-SCNC: 101 MMOL/L (ref 99–110)
CK SERPL-CCNC: 118 U/L (ref 26–192)
CLARITY UR: CLEAR
CO2 SERPL-SCNC: 23 MMOL/L (ref 21–32)
COLOR UR: YELLOW
CREAT SERPL-MCNC: <0.5 MG/DL (ref 0.6–1.2)
DEPRECATED RDW RBC AUTO: 16.3 % (ref 12.4–15.4)
EOSINOPHIL # BLD: 0.1 K/UL (ref 0–0.6)
EOSINOPHIL NFR BLD: 0.9 %
EPI CELLS #/AREA URNS AUTO: 1 /HPF (ref 0–5)
GFR SERPLBLD CREATININE-BSD FMLA CKD-EPI: >90 ML/MIN/{1.73_M2}
GLUCOSE SERPL-MCNC: 104 MG/DL (ref 70–99)
GLUCOSE UR STRIP.AUTO-MCNC: NEGATIVE MG/DL
HCT VFR BLD AUTO: 32 % (ref 36–48)
HGB BLD-MCNC: 10.7 G/DL (ref 12–16)
HGB UR QL STRIP.AUTO: NEGATIVE
HYALINE CASTS #/AREA URNS AUTO: 0 /LPF (ref 0–8)
INR PPP: 1.04 (ref 0.85–1.15)
KETONES UR STRIP.AUTO-MCNC: 15 MG/DL
LEUKOCYTE ESTERASE UR QL STRIP.AUTO: NEGATIVE
LYMPHOCYTES # BLD: 1.7 K/UL (ref 1–5.1)
LYMPHOCYTES NFR BLD: 21.8 %
MCH RBC QN AUTO: 34.7 PG (ref 26–34)
MCHC RBC AUTO-ENTMCNC: 33.4 G/DL (ref 31–36)
MCV RBC AUTO: 104 FL (ref 80–100)
MONOCYTES # BLD: 0.8 K/UL (ref 0–1.3)
MONOCYTES NFR BLD: 10.7 %
NEUTROPHILS # BLD: 5.2 K/UL (ref 1.7–7.7)
NEUTROPHILS NFR BLD: 66.4 %
NITRITE UR QL STRIP.AUTO: NEGATIVE
NT-PROBNP SERPL-MCNC: <36 PG/ML (ref 0–124)
PH UR STRIP.AUTO: 5.5 [PH] (ref 5–8)
PLATELET # BLD AUTO: 430 K/UL (ref 135–450)
PMV BLD AUTO: 6.9 FL (ref 5–10.5)
POTASSIUM SERPL-SCNC: 3.9 MMOL/L (ref 3.5–5.1)
PROT SERPL-MCNC: 7.3 G/DL (ref 6.4–8.2)
PROT UR STRIP.AUTO-MCNC: ABNORMAL MG/DL
PROTHROMBIN TIME: 13.8 SEC (ref 11.9–14.9)
RBC # BLD AUTO: 3.08 M/UL (ref 4–5.2)
RBC CLUMPS #/AREA URNS AUTO: 1 /HPF (ref 0–4)
SODIUM SERPL-SCNC: 137 MMOL/L (ref 136–145)
SP GR UR STRIP.AUTO: 1.02 (ref 1–1.03)
TROPONIN, HIGH SENSITIVITY: 10 NG/L (ref 0–14)
UA COMPLETE W REFLEX CULTURE PNL UR: ABNORMAL
UA DIPSTICK W REFLEX MICRO PNL UR: YES
URN SPEC COLLECT METH UR: ABNORMAL
UROBILINOGEN UR STRIP-ACNC: 0.2 E.U./DL
WBC # BLD AUTO: 7.8 K/UL (ref 4–11)
WBC #/AREA URNS AUTO: 0 /HPF (ref 0–5)

## 2024-05-24 PROCEDURE — 73060 X-RAY EXAM OF HUMERUS: CPT

## 2024-05-24 PROCEDURE — 85025 COMPLETE CBC W/AUTO DIFF WBC: CPT

## 2024-05-24 PROCEDURE — 83880 ASSAY OF NATRIURETIC PEPTIDE: CPT

## 2024-05-24 PROCEDURE — 99285 EMERGENCY DEPT VISIT HI MDM: CPT

## 2024-05-24 PROCEDURE — 96374 THER/PROPH/DIAG INJ IV PUSH: CPT

## 2024-05-24 PROCEDURE — 6360000004 HC RX CONTRAST MEDICATION: Performed by: PHYSICIAN ASSISTANT

## 2024-05-24 PROCEDURE — 71260 CT THORAX DX C+: CPT

## 2024-05-24 PROCEDURE — 96375 TX/PRO/DX INJ NEW DRUG ADDON: CPT

## 2024-05-24 PROCEDURE — 82550 ASSAY OF CK (CPK): CPT

## 2024-05-24 PROCEDURE — 81001 URINALYSIS AUTO W/SCOPE: CPT

## 2024-05-24 PROCEDURE — 84484 ASSAY OF TROPONIN QUANT: CPT

## 2024-05-24 PROCEDURE — 85730 THROMBOPLASTIN TIME PARTIAL: CPT

## 2024-05-24 PROCEDURE — 6360000002 HC RX W HCPCS: Performed by: PHYSICIAN ASSISTANT

## 2024-05-24 PROCEDURE — 80053 COMPREHEN METABOLIC PANEL: CPT

## 2024-05-24 PROCEDURE — 85610 PROTHROMBIN TIME: CPT

## 2024-05-24 RX ORDER — OXYCODONE HYDROCHLORIDE AND ACETAMINOPHEN 5; 325 MG/1; MG/1
1 TABLET ORAL EVERY 6 HOURS PRN
Qty: 12 TABLET | Refills: 0 | Status: SHIPPED | OUTPATIENT
Start: 2024-05-24 | End: 2024-05-27

## 2024-05-24 RX ORDER — MORPHINE SULFATE 4 MG/ML
4 INJECTION, SOLUTION INTRAMUSCULAR; INTRAVENOUS ONCE
Status: COMPLETED | OUTPATIENT
Start: 2024-05-24 | End: 2024-05-24

## 2024-05-24 RX ORDER — ONDANSETRON 2 MG/ML
4 INJECTION INTRAMUSCULAR; INTRAVENOUS ONCE
Status: COMPLETED | OUTPATIENT
Start: 2024-05-24 | End: 2024-05-24

## 2024-05-24 RX ADMIN — ONDANSETRON 4 MG: 2 INJECTION INTRAMUSCULAR; INTRAVENOUS at 16:48

## 2024-05-24 RX ADMIN — MORPHINE SULFATE 4 MG: 4 INJECTION, SOLUTION INTRAMUSCULAR; INTRAVENOUS at 16:47

## 2024-05-24 RX ADMIN — IOPAMIDOL 75 ML: 755 INJECTION, SOLUTION INTRAVENOUS at 17:59

## 2024-05-24 ASSESSMENT — ENCOUNTER SYMPTOMS
COUGH: 0
ABDOMINAL PAIN: 0
RESPIRATORY NEGATIVE: 1
PHOTOPHOBIA: 0
COLOR CHANGE: 0
SHORTNESS OF BREATH: 0
CONSTIPATION: 0
VOMITING: 0
CHEST TIGHTNESS: 0
DIARRHEA: 0
NAUSEA: 0
BACK PAIN: 0

## 2024-05-24 ASSESSMENT — PAIN SCALES - GENERAL: PAINLEVEL_OUTOF10: 10

## 2024-05-24 NOTE — ED PROVIDER NOTES
XR HUMERUS LEFT (MIN 2 VIEWS)   Final Result   No acute osseous abnormality.               LABS:    Labs Reviewed   CBC WITH AUTO DIFFERENTIAL - Abnormal; Notable for the following components:       Result Value    RBC 3.08 (*)     Hemoglobin 10.7 (*)     Hematocrit 32.0 (*)     .0 (*)     MCH 34.7 (*)     RDW 16.3 (*)     All other components within normal limits   COMPREHENSIVE METABOLIC PANEL W/ REFLEX TO MG FOR LOW K - Abnormal; Notable for the following components:    Glucose 104 (*)     Creatinine <0.5 (*)     Albumin 3.3 (*)     Albumin/Globulin Ratio 0.8 (*)     ALT 7 (*)     All other components within normal limits   URINALYSIS WITH REFLEX TO CULTURE - Abnormal; Notable for the following components:    Ketones, Urine 15 (*)     Protein, UA TRACE (*)     All other components within normal limits   TROPONIN   BRAIN NATRIURETIC PEPTIDE   CK   PROTIME-INR   APTT   MICROSCOPIC URINALYSIS       EKG:    Patient refused EKG        Exam:    Well-nourished female in no acute distress.  She was very pleasant.  Heart was regular rate rhythm with no murmurs rubs gallops.  Chest was clear to auscultation bilaterally.      Medical decision makin-year-old female presents wanting a scan of her chest.  The patient denies any shortness of breath.  She denies any fevers or chills.  She also has multiple outpatient test pending.  The patient's workup today included a cardiac workup that was negative and normal and a CT of the patient's chest was obtained that shows subtle opacities and pulmonary nodules of unknown significance.  Radiologist did note that these have increased or are new.  I read these in detail with the patient's family and the patient.  The patient will be discharged with appropriate follow-up.  I told her to follow with her primary physician for these findings and to return for any other emergencies.  She was discharged in stable condition.  I did give the patient 12 Percocet for her pain and 
(electronically signed)       Jose Kidd PA  05/25/24 0838

## 2024-05-24 NOTE — ED NOTES
Patient discharged  to home in stable condition via private car.  Discharge instructions and prescriptions reviewed with patient.   Patient verbalized understanding.   Patient advised not to drive, drink ETOH or operate machinery while taking pain medications at home.  Patient verbalized understanding.   All belongings in tow including discharge paperwork.  Pts family driving pt home.

## 2024-05-24 NOTE — ED NOTES
Pt states she was sent by oncologist to ED for jarrell shoulder, jarrell wrist, left knee pain.  Pain started 2 weeks ago. No known injury that she is aware of. She is concerned for blood clot in her neck.  Pt refusing telemetry monitoring or EKG, due to, the stickers give her sores on her chest.  Offered 2 different kinds of stickers from MRI to try for telemetry/EKG readings. Mehul MAGANA made aware of pt refusing Telemetry/EKG.  Family at pts bedside.       Patient alert and oriented x4.  GCS 15/15.  Skin appropriate for ethnicity, dry and intact.  No signs of acute distress noted at this time. Regular respiratory pattern, normal respiratory depth, unlabored respirations.   Jarrell shoulder, jarrell wrist, left knee pain.  10/10 pain.      Patient placed on a continuous pulse oximetry. Bedside Monitor on with Alarms audible and alarms set.  Patient on cycling blood pressure.     Patient oriented to room and ED throughput process.  Patient educated on all orders, including any medications.  Patient educated on chief complaint/symptoms. Patient encouraged to ask questions regarding care, medications or treatment plan.  Patient aware of how to reach staff with questions/concerns.  Safety measures and Fall risk precautions in place, ED bed locked in lowest position, bed side table within reach, and call light in reach.  Plan of care ongoing.  Patient expresses no other needs at this time.     Pts TV not working in her room. Offered to move rooms for pt, pt and family declined at this time.

## 2024-05-24 NOTE — DISCHARGE INSTRUCTIONS
You have some nodules and opacities in your lungs that need to be followed up with your primary care physician.  I have prescribed you some Percocet for pain.  Call your doctor Tuesday to get more pain medications and follow-up.  Return for any emergencies

## 2024-05-24 NOTE — TELEPHONE ENCOUNTER
Looking at a surgery date of 7/3/24 @815, arrival at 645am. The total length of the case is still pending as we are waiting on the note for Dr. Moreau. I just need to know if this date and time will work for her. If not I will have to call Adelina at Dr Saunders office and look for a date further out as that was the first available for both surgeons.  We can schedule her for an office visit to come speak with Dr. Ferrell more in depth.

## 2024-05-24 NOTE — TELEPHONE ENCOUNTER
LMOR for patient to return my call. I told her to have the girls transfer her to me in general surgery. I also stated that I would be leaving at 230pm and returning on Tuesday 5/28.

## 2024-05-28 NOTE — TELEPHONE ENCOUNTER
Spoke with Tracy. She states that a surgery date of Wednesday 7/3/24 @815 would work for her. She asked to come in and speak with Dr Ferrell about the plan for surgery ( Dr Ferrell and I discussed this last week). She is scheduled to come in on Monday 6/10/24 @245 to go over the surgery plan.  She will need to know details of the surgery as well as the prep.    I called and spoke to Adelina at Dr Saunders office and she is still waiting on her chart. She did confirm that 73/24 @815 is on hold for the patient.

## 2024-05-29 ENCOUNTER — TELEPHONE (OUTPATIENT)
Dept: WOMENS IMAGING | Age: 69
End: 2024-05-29

## 2024-05-29 ENCOUNTER — PREP FOR PROCEDURE (OUTPATIENT)
Dept: SURGERY | Age: 69
End: 2024-05-29

## 2024-05-29 DIAGNOSIS — C50.911 PRIMARY BREAST MALIGNANCY, RIGHT (HCC): Primary | ICD-10-CM

## 2024-05-29 DIAGNOSIS — C50.911 PRIMARY BREAST MALIGNANCY, RIGHT (HCC): ICD-10-CM

## 2024-05-29 NOTE — TELEPHONE ENCOUNTER
Left message for patient on VM requesting return call. Reaching out to schedule RFID tag placement and perform pre-procedure patient education.

## 2024-05-29 NOTE — TELEPHONE ENCOUNTER
Reached out to Tracy and daughter to see if they had any other questions. Confirmed surgery date and follow up consult with Dr. Ferrell on 6/10/24. Also spoke to Liliana at Dr. Moreau's office. Both will call me back with any additional questions or concerns.         Thanks, Tangela

## 2024-05-30 NOTE — TELEPHONE ENCOUNTER
Spoke to Tracy and daughter Kris. Due to radiation Dr. Moreau will delay reconstruction. Patient and daughter are aware. Moved surgery date to 6/11/24 @ 1030 arrival time is 0900. Patient was notified that the lymph node will need tagged prior to surgery. Nurse navigators will call and schedule. Possible localization date of 6/4/24. Patient and daughter are aware of history and physical will need to be obtained prior to surgery.    Laceration of finger of right hand, initial encounter

## 2024-05-31 ENCOUNTER — TELEPHONE (OUTPATIENT)
Dept: SURGERY | Age: 69
End: 2024-05-31

## 2024-05-31 NOTE — TELEPHONE ENCOUNTER
Spoke to Tracy about setting up a VV appt prior to surgery. Her daughter Kris was on the phone scheduling another appt. Please send the call to me when she calls back. Post op appt is scheduled for 6/20/24 @ 0900    Tangela Ha

## 2024-06-03 ENCOUNTER — TELEPHONE (OUTPATIENT)
Dept: PULMONOLOGY | Age: 69
End: 2024-06-03

## 2024-06-03 DIAGNOSIS — C50.911 PRIMARY BREAST MALIGNANCY, RIGHT (HCC): Primary | ICD-10-CM

## 2024-06-03 NOTE — TELEPHONE ENCOUNTER
Called pt, left voice mail on home number and cell number to call office back to schedule appointment per ref by dr schultz

## 2024-06-03 NOTE — PROGRESS NOTES
PCP:  Medical Oncology: Blake  Radiation:  Other: Prieto      [id number]:  IIB right breast cancer      HPI:  Ms. Thompson is a 68 y.o. woman who presents today with a new diagnosis of grade 3, right sided invasive ductal breast cancer. ER - MD- HER2 negative.  Her lymph node was amplified by FISH.    She states that she has not noticed any new abnormalities such as masses, skin changes, color changes, nipple discharge, or changes to the nipple-areolar complex.     She is here today in follow-up to discuss surgery.  She is currently being treated for pneumonia.  Surgical planning has been held for this treatment.  She also describes full body aches and is being referred to rheumatology.        INTERVAL HISTORY:    On 11/17/2023 she underwent bilateral breast imaging.  There is a 2.5 cm hypoechoic mass at 7:00 as well as a 6 mm and 4 mm hypoechoic mass at 8:00 in the right breast.  The right axilla shows an enlarged lymph node.  Suspicious calcifications extending from the dominant mass are also noted.  The left breast was negative.  BI-RADS 4C.    On 12/7/2023 she underwent 2 site core needle biopsy of the right breast as well as right axillary biopsy.  Pathology was considered concordant. [id number] biopsy markers are  by about 5 cm.    Pathology of the right breast 7 o'clock position identified grade 3 invasive ductal carcinoma.  ER negative MD negative HER2 negative.  An open coil marker was placed.    Pathology of the right breast 8 o'clock position identified grade 3 invasive ductal carcinoma.  ER negative MD negative HER2 negative.  A butterfly marker was placed.    Pathology of the right axillary lymph node identified carcinoma involving a lymph node tissue.  ER negative MD negative HER2 positive.    On 5/16/2020 for her interval imaging was reviewed.  The dominant mass in the right breast containing a biopsy marker has decreased in size, now measuring 1.5 cm.  The small posterior lateral and

## 2024-06-05 NOTE — TELEPHONE ENCOUNTER
Spoke to Tracy , she currently has pneumonia. Surgery was pushed back to original date of 7/3/24 @ 0945. Vanesa has a VV with Dr. Ferrell on 6/10/24 @ 2:45PM.  Will call me back if needed.     Will try and find a sooner date when patient is feeling better.     Thanks, Tangela

## 2024-06-10 ENCOUNTER — TELEPHONE (OUTPATIENT)
Dept: WOMENS IMAGING | Age: 69
End: 2024-06-10

## 2024-06-10 ENCOUNTER — INITIAL CONSULT (OUTPATIENT)
Dept: SURGERY | Age: 69
End: 2024-06-10
Payer: MEDICARE

## 2024-06-10 VITALS
HEART RATE: 87 BPM | HEIGHT: 64 IN | SYSTOLIC BLOOD PRESSURE: 110 MMHG | OXYGEN SATURATION: 98 % | DIASTOLIC BLOOD PRESSURE: 62 MMHG | WEIGHT: 145 LBS | BODY MASS INDEX: 24.75 KG/M2 | RESPIRATION RATE: 16 BRPM

## 2024-06-10 DIAGNOSIS — C50.911 PRIMARY BREAST MALIGNANCY, RIGHT (HCC): Primary | ICD-10-CM

## 2024-06-10 PROCEDURE — 99205 OFFICE O/P NEW HI 60 MIN: CPT | Performed by: SURGERY

## 2024-06-10 PROCEDURE — G2212 PROLONG OUTPT/OFFICE VIS: HCPCS | Performed by: SURGERY

## 2024-06-10 RX ORDER — SODIUM CHLORIDE, SODIUM LACTATE, POTASSIUM CHLORIDE, CALCIUM CHLORIDE 600; 310; 30; 20 MG/100ML; MG/100ML; MG/100ML; MG/100ML
INJECTION, SOLUTION INTRAVENOUS CONTINUOUS
OUTPATIENT
Start: 2024-06-10

## 2024-06-10 RX ORDER — SODIUM CHLORIDE 9 MG/ML
INJECTION, SOLUTION INTRAVENOUS PRN
OUTPATIENT
Start: 2024-06-10

## 2024-06-10 RX ORDER — SODIUM CHLORIDE 0.9 % (FLUSH) 0.9 %
5-40 SYRINGE (ML) INJECTION EVERY 12 HOURS SCHEDULED
OUTPATIENT
Start: 2024-06-10

## 2024-06-10 RX ORDER — SODIUM CHLORIDE 0.9 % (FLUSH) 0.9 %
5-40 SYRINGE (ML) INJECTION PRN
OUTPATIENT
Start: 2024-06-10

## 2024-06-10 NOTE — TELEPHONE ENCOUNTER
Left message for patient on VM requesting return call. Reaching out to schedule RFID tag placement and perform pre-biopsy patient education.

## 2024-06-12 ENCOUNTER — TELEPHONE (OUTPATIENT)
Dept: SURGERY | Age: 69
End: 2024-06-12

## 2024-06-12 NOTE — TELEPHONE ENCOUNTER
Spoke to Tracy, VV visit will be scheduled for 7/4/24 @ 0900. She still needs to have H&P prior to surgery. If daughter Kris calls back please send the call to me.     Thanks,Tangela

## 2024-06-14 ENCOUNTER — OFFICE VISIT (OUTPATIENT)
Dept: PULMONOLOGY | Age: 69
End: 2024-06-14
Payer: MEDICARE

## 2024-06-14 VITALS
BODY MASS INDEX: 25 KG/M2 | DIASTOLIC BLOOD PRESSURE: 64 MMHG | SYSTOLIC BLOOD PRESSURE: 120 MMHG | WEIGHT: 146.4 LBS | OXYGEN SATURATION: 99 % | HEART RATE: 105 BPM | HEIGHT: 64 IN

## 2024-06-14 DIAGNOSIS — J18.9 MULTIFOCAL PNEUMONIA: Primary | ICD-10-CM

## 2024-06-14 PROCEDURE — 1123F ACP DISCUSS/DSCN MKR DOCD: CPT | Performed by: INTERNAL MEDICINE

## 2024-06-14 PROCEDURE — 99204 OFFICE O/P NEW MOD 45 MIN: CPT | Performed by: INTERNAL MEDICINE

## 2024-06-14 NOTE — TELEPHONE ENCOUNTER
Spoke to Kris , confirmed she had a phone she could do a VV with @ 0900 on 7/4/24.       Thanks, Tangela

## 2024-06-14 NOTE — PROGRESS NOTES
PULMONARY OFFICE NEW PATIENT VISIT    CONSULTING PHYSICIAN:      REASON FOR VISIT:   Chief Complaint   Patient presents with    Pulmonary nodules     Just got off antibiotics and pred from pneumonia. Fatigue all the time. SOB all the time. Hard to sit up.       DATE OF VISIT: 6/14/2024    HISTORY OF PRESENT ILLNESS: 68 y.o. year old female with breast cancer who is here for evaluation of abnormal CT.    Patient was diagnosed with right breast cancer in November 2023.  She was started on chemotherapy and has finished 6 cycles.  Plan is to undergo surgery followed by radiation and hormone replacement therapy.  Surgery was put on hold because of patient's symptoms.  Patient stated that she was hospitalized in March 2024 for chest pain.  She had workup performed which did not show any evidence of infection.  CT chest at that time did not show any evidence of pneumonia.  Patient's chemotherapy was finished in April 2024.  In third week of May, patient started noticing sore throat along with cough and ear pain followed by generalized bodyaches and muscle weakness.  Denied any shortness of breath or hemoptysis.  She went to ER for evaluation and had CT chest performed on 5/20/2024 that showed showed that showed multiple pleural-based opacities involving bilateral upper lobes, right middle lobe and right lower lobe which are new since March 2024.    Patient was prescribed antibiotics which she did for 10 days.  Cough and sore throat has improved however generalized body ache and muscle pain with weakness did not improve.  Patient went through multiple pain medications including narcotics without much improvement.  It is now slowly getting better.  Patient stated that she was even having hard time raising her arms above her head as well as getting up from the chair.  She is able to do that now although she is still weak and has some pain.  Recently had bone scan on 6/7/2024 that did not show any evidence of metastatic

## 2024-06-21 ENCOUNTER — HOSPITAL ENCOUNTER (OUTPATIENT)
Dept: WOMENS IMAGING | Age: 69
Discharge: HOME | End: 2024-06-21
Payer: MEDICARE

## 2024-06-21 ENCOUNTER — HOSPITAL ENCOUNTER (OUTPATIENT)
Dept: ULTRASOUND IMAGING | Age: 69
Discharge: HOME | End: 2024-06-21
Payer: MEDICARE

## 2024-06-21 DIAGNOSIS — C50.911 PRIMARY BREAST MALIGNANCY, RIGHT (HCC): ICD-10-CM

## 2024-06-21 PROCEDURE — 77065 DX MAMMO INCL CAD UNI: CPT

## 2024-06-21 PROCEDURE — 19285 PERQ DEV BREAST 1ST US IMAG: CPT

## 2024-06-21 PROCEDURE — 2500000003 HC RX 250 WO HCPCS: Performed by: RADIOLOGY

## 2024-06-21 RX ORDER — LIDOCAINE HYDROCHLORIDE 10 MG/ML
5 INJECTION, SOLUTION EPIDURAL; INFILTRATION; INTRACAUDAL; PERINEURAL ONCE
Status: COMPLETED | OUTPATIENT
Start: 2024-06-21 | End: 2024-06-21

## 2024-06-21 RX ADMIN — LIDOCAINE HYDROCHLORIDE ANHYDROUS 5 ML: 10 INJECTION, SOLUTION INFILTRATION at 14:39

## 2024-07-01 ENCOUNTER — TELEPHONE (OUTPATIENT)
Dept: SURGERY | Age: 69
End: 2024-07-01

## 2024-07-01 NOTE — TELEPHONE ENCOUNTER
Pt calling states her pcp cannot sign on her surgery   On 7/3 needs to know what meds to discontinue   Pt wants to speak to Tangela I informed her she   Was out of the office returns back tomorrow   Pls return call back @ [phone]

## 2024-07-02 ENCOUNTER — TELEPHONE (OUTPATIENT)
Dept: SURGERY | Age: 69
End: 2024-07-02

## 2024-07-02 ENCOUNTER — HOSPITAL ENCOUNTER (OUTPATIENT)
Age: 69
Discharge: HOME | End: 2024-07-02

## 2024-07-02 NOTE — TELEPHONE ENCOUNTER
I spoke with daughter Kris and Tracy. Dr. Ferrell spoke to her rheumatologist and knows about prednisone 20mg daily x 2 weeks. She is fine with moving forward with surgery. Tracy stated she is on a medication her PCP states she needs to stop a couple days prior to surgery but Tracy's is unsure what medication it is. Will follow up with PCP at 1:30 PM then call me back.     Thanks, Tangela

## 2024-07-02 NOTE — TELEPHONE ENCOUNTER
Pt calling need pre op surgery form faxed   To [phone] any questions pls   Call pt back @ [phone]

## 2024-07-02 NOTE — TELEPHONE ENCOUNTER
Faxed forms. Spoke to Patience. Tracy will need a EKG , blood work was obtained today. Message sent to surgery scheduling to move surgery. Spoke to Geronimo ye nuc med appt. Kris and Tracy will call me back once EKG is obtained to schedule surgery.     Thanks, Tangela

## 2024-07-03 LAB
EKG ATRIAL RATE: 80 BPM
EKG DIAGNOSIS: NORMAL
EKG P AXIS: 12 DEGREES
EKG P-R INTERVAL: 106 MS
EKG Q-T INTERVAL: 360 MS
EKG QRS DURATION: 66 MS
EKG QTC CALCULATION (BAZETT): 415 MS
EKG R AXIS: 33 DEGREES
EKG T AXIS: -14 DEGREES
EKG VENTRICULAR RATE: 80 BPM

## 2024-07-08 NOTE — TELEPHONE ENCOUNTER
Pt calling back states she needs a form for her surgery to be   Faxed to primary health to filled out to go forward with her   Surgery any questions pls return call back to pt

## 2024-07-08 NOTE — TELEPHONE ENCOUNTER
Spoke to Tracy, form was faxed on 7/2/24 @ 2:51pm per confirmation. Tracy will call the PCP's office in the morning and let me know what we need to do to move forward with surgery.       Thanks, Tangela

## 2024-07-09 DIAGNOSIS — C50.911 PRIMARY BREAST MALIGNANCY, RIGHT (HCC): Primary | ICD-10-CM

## 2024-07-09 NOTE — TELEPHONE ENCOUNTER
Spoke to Tracy, she is rescheduled for 7/17/24 @ 1040 arrival 0900.  My chart VV post op scheduled, post op office visit scheduled . H& P completed , EKG completed.       Thanks, Tangela

## 2024-07-10 NOTE — PROGRESS NOTES
Name_______________________________________Printed:____________________  Date and time of surgery_____7/17/2024__________1040_________Arrival Time:_0900____/per office____   1. The instructions given regarding when and if a patient needs to stop oral intake prior to surgery varies.Follow the specific instructions you were given                  _x__Nothing to eat or to drink after Midnight the night before.                   ____Carbo loading or instructions will be given to select patients-if you have been given those instructions -please do the following                           The evening before your surgery after dinner before midnight drink 40 ounces of gatorade.If you are diabetic use sugar free.  The morning of surgery drink 40 ounces of water.This needs to be finished 3 hours prior to your surgery start time.    2. Take the following pills with a small sip of water on the morning of surgery___amlodipine__omeprazole, levothyroxine_____________________________________________                  Do not take blood pressure medications ending in pril or sartan the jose prior to surgery or the morning of surgery. Dr Ricketts's patient are not to take any medications the AM of surgery.         3. Aspirin, Ibuprofen, Advil, Naproxen, Vitamin E and other Anti-inflammatory products and supplements should be stopped for 5 -7days before surgery or as directed by your physician.   4. Check with your Doctor regarding stopping Plavix, Coumadin,Eliquis, Lovenox,Effient,Pradaxa,Xarelto, Fragmin or other blood thinners and follow their instructions.   5. Do not smoke, and do not drink any alcoholic beverages 24 hours prior to surgery.  This includes NA Beer.Refrain from the usage of any recreational drugs.   6. You may brush your teeth and gargle the morning of surgery.  DO NOT SWALLOW WATER   7. You MUST make arrangements for a responsible adult to stay on site while you are here and take you home after your surgery. You will  not be allowed to leave alone or drive yourself home.  It is strongly suggested someone stay with you the first 24 hrs. Your surgery will be cancelled if you do not have a ride home.   8. A parent/legal guardian must accompany a child scheduled for surgery and plan to stay at the hospital until the child is discharged.  Please do not bring other children with you.   9. Please wear simple, loose fitting clothing to the hospital.  Do not bring valuables (money, credit cards, checkbooks, etc.) Do not wear any makeup (including no eye makeup) or nail polish on your fingers or toes.             10. DO NOT wear any jewelry or piercings on day of surgery.  All body piercing jewelry must be removed.             11. If you have ___dentures, they will be removed before going to the OR; we will provide you a container.  If you wear ___contact lenses or ___glasses, they will be removed; please bring a case for them.             12. Please see your family doctor/pediatrician for a history & physical and/or concerning medications.  Bring any test results/reports from your physician's office.   PCP__________________Phone___________H&P Appt. Date________             13 If you  have a Living Will and Durable Power of  for Healthcare, please bring in a copy.             14. Notify your Surgeon if you develop any illness between now and surgery  time, cough, cold, fever, sore throat, nausea, vomiting, etc.  Please notify your surgeon if you experience dizziness, shortness of breath or blurred vision between now & the time of your surgery             15. DO NOT shave your operative site 96 hours prior to surgery. For face & neck surgery, men may use an electric razor 48 hours prior to surgery.             16. Shower the night before or morning of surgery using an antibacterial soap or as you have been instructed.             17. To provide excellent care visitors will be limited to one in the room at any given time.

## 2024-07-15 NOTE — PROGRESS NOTES
Subjective:  There are no acute postoperative issues.  Pain is well controlled.  She is having no nausea/vomiting and tolerating a diet.    Objective:  Wt Readings from Last 3 Encounters:   07/10/24 67.1 kg (148 lb)   06/14/24 66.4 kg (146 lb 6.4 oz)   06/10/24 65.8 kg (145 lb)     Temp Readings from Last 3 Encounters:   05/24/24 97.8 °F (36.6 °C) (Oral)   03/26/24 98.1 °F (36.7 °C) (Oral)   12/27/23 97 °F (36.1 °C) (Temporal)     BP Readings from Last 3 Encounters:   06/14/24 120/64   06/10/24 110/62   05/24/24 107/71     Pulse Readings from Last 3 Encounters:   06/14/24 (!) 105   06/10/24 87   05/24/24 88       [unfilled]  [facility]@      Exam:  General: no acute distress  Breast: surgical sites are appropriately tender to palpation, incisions are C/D/I, no hematomas present, flaps viable, dressings/binder in place,  JUDI appearing serosanguineous   Respiratory: respirations are non-labored and there is no audible distress  Cardiovascular: regular rate, extremities appear well perfused  Neurologic: alert, oriented    Assessment/Plan: 69 y.o. woman POD #  1  s/p bilateral  mastectomies  with sentinel node biopsy for right breast cancer    Continue pain control   Diet as tolerated  Local wound care/surgical binder  Discussed trimming drains  Pathology pending.  Follow up at 2 week postop.

## 2024-07-16 ENCOUNTER — ANESTHESIA EVENT (OUTPATIENT)
Dept: OPERATING ROOM | Age: 69
End: 2024-07-16
Payer: MEDICARE

## 2024-07-17 ENCOUNTER — APPOINTMENT (OUTPATIENT)
Dept: WOMENS IMAGING | Age: 69
End: 2024-07-17
Attending: SURGERY
Payer: MEDICARE

## 2024-07-17 ENCOUNTER — HOSPITAL ENCOUNTER (OUTPATIENT)
Age: 69
Setting detail: OUTPATIENT SURGERY
Discharge: HOME | End: 2024-07-17
Attending: SURGERY | Admitting: SURGERY
Payer: MEDICARE

## 2024-07-17 ENCOUNTER — HOSPITAL ENCOUNTER (OUTPATIENT)
Dept: NUCLEAR MEDICINE | Age: 69
Discharge: HOME | End: 2024-07-17
Attending: SURGERY
Payer: MEDICARE

## 2024-07-17 ENCOUNTER — ANESTHESIA (OUTPATIENT)
Dept: OPERATING ROOM | Age: 69
End: 2024-07-17
Payer: MEDICARE

## 2024-07-17 VITALS
SYSTOLIC BLOOD PRESSURE: 106 MMHG | HEIGHT: 64 IN | WEIGHT: 152 LBS | DIASTOLIC BLOOD PRESSURE: 70 MMHG | BODY MASS INDEX: 25.95 KG/M2 | TEMPERATURE: 97.8 F | RESPIRATION RATE: 10 BRPM | HEART RATE: 79 BPM | OXYGEN SATURATION: 100 %

## 2024-07-17 DIAGNOSIS — G89.18 POST-OP PAIN: Primary | ICD-10-CM

## 2024-07-17 DIAGNOSIS — C50.911 PRIMARY BREAST MALIGNANCY, RIGHT (HCC): ICD-10-CM

## 2024-07-17 LAB
GLUCOSE BLD-MCNC: 100 MG/DL (ref 70–99)
PERFORMED ON: ABNORMAL

## 2024-07-17 PROCEDURE — 3700000001 HC ADD 15 MINUTES (ANESTHESIA): Performed by: SURGERY

## 2024-07-17 PROCEDURE — 6360000002 HC RX W HCPCS: Performed by: SURGERY

## 2024-07-17 PROCEDURE — C9290 INJ, BUPIVACAINE LIPOSOME: HCPCS | Performed by: SURGERY

## 2024-07-17 PROCEDURE — 88307 TISSUE EXAM BY PATHOLOGIST: CPT

## 2024-07-17 PROCEDURE — C9250 ARTISS FIBRIN SEALANT: HCPCS | Performed by: SURGERY

## 2024-07-17 PROCEDURE — 38792 RA TRACER ID OF SENTINL NODE: CPT

## 2024-07-17 PROCEDURE — 7100000001 HC PACU RECOVERY - ADDTL 15 MIN: Performed by: SURGERY

## 2024-07-17 PROCEDURE — 7100000011 HC PHASE II RECOVERY - ADDTL 15 MIN: Performed by: SURGERY

## 2024-07-17 PROCEDURE — 38900 IO MAP OF SENT LYMPH NODE: CPT | Performed by: SURGERY

## 2024-07-17 PROCEDURE — 7100000010 HC PHASE II RECOVERY - FIRST 15 MIN: Performed by: SURGERY

## 2024-07-17 PROCEDURE — 2580000003 HC RX 258: Performed by: SURGERY

## 2024-07-17 PROCEDURE — 6370000000 HC RX 637 (ALT 250 FOR IP): Performed by: STUDENT IN AN ORGANIZED HEALTH CARE EDUCATION/TRAINING PROGRAM

## 2024-07-17 PROCEDURE — 2500000003 HC RX 250 WO HCPCS: Performed by: NURSE ANESTHETIST, CERTIFIED REGISTERED

## 2024-07-17 PROCEDURE — 7100000000 HC PACU RECOVERY - FIRST 15 MIN: Performed by: SURGERY

## 2024-07-17 PROCEDURE — 19303 MAST SIMPLE COMPLETE: CPT | Performed by: SURGERY

## 2024-07-17 PROCEDURE — 2720000010 HC SURG SUPPLY STERILE: Performed by: SURGERY

## 2024-07-17 PROCEDURE — 2500000003 HC RX 250 WO HCPCS: Performed by: SURGERY

## 2024-07-17 PROCEDURE — 2709999900 HC NON-CHARGEABLE SUPPLY: Performed by: SURGERY

## 2024-07-17 PROCEDURE — 88342 IMHCHEM/IMCYTCHM 1ST ANTB: CPT

## 2024-07-17 PROCEDURE — 38792 RA TRACER ID OF SENTINL NODE: CPT | Performed by: SURGERY

## 2024-07-17 PROCEDURE — 38525 BIOPSY/REMOVAL LYMPH NODES: CPT | Performed by: SURGERY

## 2024-07-17 PROCEDURE — 3430000000 HC RX DIAGNOSTIC RADIOPHARMACEUTICAL: Performed by: SURGERY

## 2024-07-17 PROCEDURE — A9520 TC99 TILMANOCEPT DIAG 0.5MCI: HCPCS | Performed by: SURGERY

## 2024-07-17 PROCEDURE — 88331 PATH CONSLTJ SURG 1 BLK 1SPC: CPT

## 2024-07-17 PROCEDURE — 3600000014 HC SURGERY LEVEL 4 ADDTL 15MIN: Performed by: SURGERY

## 2024-07-17 PROCEDURE — A4217 STERILE WATER/SALINE, 500 ML: HCPCS | Performed by: SURGERY

## 2024-07-17 PROCEDURE — 3700000000 HC ANESTHESIA ATTENDED CARE: Performed by: SURGERY

## 2024-07-17 PROCEDURE — 6360000002 HC RX W HCPCS: Performed by: NURSE ANESTHETIST, CERTIFIED REGISTERED

## 2024-07-17 PROCEDURE — 3600000004 HC SURGERY LEVEL 4 BASE: Performed by: SURGERY

## 2024-07-17 PROCEDURE — 76098 X-RAY EXAM SURGICAL SPECIMEN: CPT

## 2024-07-17 RX ORDER — DIPHENHYDRAMINE HYDROCHLORIDE 50 MG/ML
12.5 INJECTION INTRAMUSCULAR; INTRAVENOUS
Status: DISCONTINUED | OUTPATIENT
Start: 2024-07-17 | End: 2024-07-17 | Stop reason: HOSPADM

## 2024-07-17 RX ORDER — OXYCODONE HYDROCHLORIDE 5 MG/1
10 TABLET ORAL PRN
Status: COMPLETED | OUTPATIENT
Start: 2024-07-17 | End: 2024-07-17

## 2024-07-17 RX ORDER — IPRATROPIUM BROMIDE AND ALBUTEROL SULFATE 2.5; .5 MG/3ML; MG/3ML
1 SOLUTION RESPIRATORY (INHALATION)
Status: DISCONTINUED | OUTPATIENT
Start: 2024-07-17 | End: 2024-07-17 | Stop reason: HOSPADM

## 2024-07-17 RX ORDER — BUPIVACAINE HYDROCHLORIDE 2.5 MG/ML
INJECTION, SOLUTION EPIDURAL; INFILTRATION; INTRACAUDAL
Status: COMPLETED | OUTPATIENT
Start: 2024-07-17 | End: 2024-07-17

## 2024-07-17 RX ORDER — OXYCODONE HYDROCHLORIDE 5 MG/1
5 TABLET ORAL PRN
Status: COMPLETED | OUTPATIENT
Start: 2024-07-17 | End: 2024-07-17

## 2024-07-17 RX ORDER — HYDROMORPHONE HYDROCHLORIDE 2 MG/ML
0.25 INJECTION, SOLUTION INTRAMUSCULAR; INTRAVENOUS; SUBCUTANEOUS EVERY 5 MIN PRN
Status: DISCONTINUED | OUTPATIENT
Start: 2024-07-17 | End: 2024-07-17 | Stop reason: HOSPADM

## 2024-07-17 RX ORDER — SODIUM CHLORIDE 0.9 % (FLUSH) 0.9 %
5-40 SYRINGE (ML) INJECTION EVERY 12 HOURS SCHEDULED
Status: DISCONTINUED | OUTPATIENT
Start: 2024-07-17 | End: 2024-07-17 | Stop reason: HOSPADM

## 2024-07-17 RX ORDER — HYDROMORPHONE HYDROCHLORIDE 2 MG/ML
INJECTION, SOLUTION INTRAMUSCULAR; INTRAVENOUS; SUBCUTANEOUS PRN
Status: DISCONTINUED | OUTPATIENT
Start: 2024-07-17 | End: 2024-07-17 | Stop reason: SDUPTHER

## 2024-07-17 RX ORDER — SODIUM CHLORIDE 9 MG/ML
INJECTION, SOLUTION INTRAVENOUS CONTINUOUS
Status: DISCONTINUED | OUTPATIENT
Start: 2024-07-17 | End: 2024-07-17 | Stop reason: HOSPADM

## 2024-07-17 RX ORDER — MAGNESIUM HYDROXIDE 1200 MG/15ML
LIQUID ORAL CONTINUOUS PRN
Status: COMPLETED | OUTPATIENT
Start: 2024-07-17 | End: 2024-07-17

## 2024-07-17 RX ORDER — DEXMEDETOMIDINE HYDROCHLORIDE 100 UG/ML
INJECTION, SOLUTION INTRAVENOUS PRN
Status: DISCONTINUED | OUTPATIENT
Start: 2024-07-17 | End: 2024-07-17 | Stop reason: SDUPTHER

## 2024-07-17 RX ORDER — DEXAMETHASONE SODIUM PHOSPHATE 4 MG/ML
INJECTION, SOLUTION INTRA-ARTICULAR; INTRALESIONAL; INTRAMUSCULAR; INTRAVENOUS; SOFT TISSUE PRN
Status: DISCONTINUED | OUTPATIENT
Start: 2024-07-17 | End: 2024-07-17 | Stop reason: SDUPTHER

## 2024-07-17 RX ORDER — SODIUM CHLORIDE 0.9 % (FLUSH) 0.9 %
5-40 SYRINGE (ML) INJECTION PRN
Status: DISCONTINUED | OUTPATIENT
Start: 2024-07-17 | End: 2024-07-17 | Stop reason: HOSPADM

## 2024-07-17 RX ORDER — PREDNISONE 10 MG/1
TABLET ORAL
COMMUNITY
Start: 2024-06-25

## 2024-07-17 RX ORDER — MIDAZOLAM HYDROCHLORIDE 1 MG/ML
INJECTION INTRAMUSCULAR; INTRAVENOUS PRN
Status: DISCONTINUED | OUTPATIENT
Start: 2024-07-17 | End: 2024-07-17 | Stop reason: SDUPTHER

## 2024-07-17 RX ORDER — COVID-19 ANTIGEN TEST
KIT MISCELLANEOUS
COMMUNITY

## 2024-07-17 RX ORDER — ONDANSETRON 2 MG/ML
4 INJECTION INTRAMUSCULAR; INTRAVENOUS
Status: DISCONTINUED | OUTPATIENT
Start: 2024-07-17 | End: 2024-07-17 | Stop reason: HOSPADM

## 2024-07-17 RX ORDER — LABETALOL HYDROCHLORIDE 5 MG/ML
10 INJECTION, SOLUTION INTRAVENOUS
Status: DISCONTINUED | OUTPATIENT
Start: 2024-07-17 | End: 2024-07-17 | Stop reason: HOSPADM

## 2024-07-17 RX ORDER — ISOSULFAN BLUE 50 MG/5ML
INJECTION, SOLUTION SUBCUTANEOUS
Status: COMPLETED | OUTPATIENT
Start: 2024-07-17 | End: 2024-07-17

## 2024-07-17 RX ORDER — PHENYLEPHRINE HCL IN 0.9% NACL 1 MG/10 ML
SYRINGE (ML) INTRAVENOUS PRN
Status: DISCONTINUED | OUTPATIENT
Start: 2024-07-17 | End: 2024-07-17 | Stop reason: SDUPTHER

## 2024-07-17 RX ORDER — OXYCODONE HYDROCHLORIDE AND ACETAMINOPHEN 5; 325 MG/1; MG/1
1 TABLET ORAL
Qty: 35 TABLET | Refills: 0 | Status: SHIPPED | OUTPATIENT
Start: 2024-07-17 | End: 2024-07-24

## 2024-07-17 RX ORDER — OXYCODONE HYDROCHLORIDE 5 MG/1
TABLET ORAL
Status: DISCONTINUED
Start: 2024-07-17 | End: 2024-07-17 | Stop reason: HOSPADM

## 2024-07-17 RX ORDER — ACETAMINOPHEN 500 MG
1000 TABLET ORAL ONCE
Status: COMPLETED | OUTPATIENT
Start: 2024-07-17 | End: 2024-07-17

## 2024-07-17 RX ORDER — FENTANYL CITRATE 50 UG/ML
INJECTION, SOLUTION INTRAMUSCULAR; INTRAVENOUS PRN
Status: DISCONTINUED | OUTPATIENT
Start: 2024-07-17 | End: 2024-07-17 | Stop reason: SDUPTHER

## 2024-07-17 RX ORDER — LIDOCAINE HYDROCHLORIDE 20 MG/ML
INJECTION, SOLUTION INFILTRATION; PERINEURAL PRN
Status: DISCONTINUED | OUTPATIENT
Start: 2024-07-17 | End: 2024-07-17 | Stop reason: SDUPTHER

## 2024-07-17 RX ORDER — PROPOFOL 10 MG/ML
INJECTION, EMULSION INTRAVENOUS PRN
Status: DISCONTINUED | OUTPATIENT
Start: 2024-07-17 | End: 2024-07-17 | Stop reason: SDUPTHER

## 2024-07-17 RX ORDER — FIBRINOGEN HUMAN, HUMAN THROMBIN 90; 500 [IU]/ML; [IU]/ML
SOLUTION TOPICAL
Status: COMPLETED | OUTPATIENT
Start: 2024-07-17 | End: 2024-07-17

## 2024-07-17 RX ORDER — DIPHENHYDRAMINE HYDROCHLORIDE 50 MG/ML
INJECTION INTRAMUSCULAR; INTRAVENOUS PRN
Status: DISCONTINUED | OUTPATIENT
Start: 2024-07-17 | End: 2024-07-17 | Stop reason: SDUPTHER

## 2024-07-17 RX ORDER — HALOPERIDOL 5 MG/ML
1 INJECTION INTRAMUSCULAR
Status: DISCONTINUED | OUTPATIENT
Start: 2024-07-17 | End: 2024-07-17 | Stop reason: HOSPADM

## 2024-07-17 RX ORDER — ONDANSETRON 2 MG/ML
INJECTION INTRAMUSCULAR; INTRAVENOUS PRN
Status: DISCONTINUED | OUTPATIENT
Start: 2024-07-17 | End: 2024-07-17 | Stop reason: SDUPTHER

## 2024-07-17 RX ORDER — SODIUM CHLORIDE 9 MG/ML
INJECTION, SOLUTION INTRAVENOUS PRN
Status: DISCONTINUED | OUTPATIENT
Start: 2024-07-17 | End: 2024-07-17 | Stop reason: HOSPADM

## 2024-07-17 RX ORDER — MAGNESIUM SULFATE HEPTAHYDRATE 500 MG/ML
INJECTION, SOLUTION INTRAMUSCULAR; INTRAVENOUS PRN
Status: DISCONTINUED | OUTPATIENT
Start: 2024-07-17 | End: 2024-07-17 | Stop reason: SDUPTHER

## 2024-07-17 RX ORDER — HYDRALAZINE HYDROCHLORIDE 20 MG/ML
10 INJECTION INTRAMUSCULAR; INTRAVENOUS
Status: DISCONTINUED | OUTPATIENT
Start: 2024-07-17 | End: 2024-07-17 | Stop reason: HOSPADM

## 2024-07-17 RX ORDER — ROCURONIUM BROMIDE 10 MG/ML
INJECTION, SOLUTION INTRAVENOUS PRN
Status: DISCONTINUED | OUTPATIENT
Start: 2024-07-17 | End: 2024-07-17 | Stop reason: SDUPTHER

## 2024-07-17 RX ORDER — HYDROMORPHONE HYDROCHLORIDE 2 MG/ML
0.5 INJECTION, SOLUTION INTRAMUSCULAR; INTRAVENOUS; SUBCUTANEOUS EVERY 5 MIN PRN
Status: DISCONTINUED | OUTPATIENT
Start: 2024-07-17 | End: 2024-07-17 | Stop reason: HOSPADM

## 2024-07-17 RX ORDER — NALOXONE HYDROCHLORIDE 0.4 MG/ML
INJECTION, SOLUTION INTRAMUSCULAR; INTRAVENOUS; SUBCUTANEOUS PRN
Status: DISCONTINUED | OUTPATIENT
Start: 2024-07-17 | End: 2024-07-17 | Stop reason: HOSPADM

## 2024-07-17 RX ADMIN — ACETAMINOPHEN 1000 MG: 500 TABLET ORAL at 09:41

## 2024-07-17 RX ADMIN — MAGNESIUM SULFATE HEPTAHYDRATE 1 G: 500 INJECTION, SOLUTION INTRAMUSCULAR; INTRAVENOUS at 10:51

## 2024-07-17 RX ADMIN — DIPHENHYDRAMINE HYDROCHLORIDE 12.5 MG: 50 INJECTION, SOLUTION INTRAMUSCULAR; INTRAVENOUS at 11:05

## 2024-07-17 RX ADMIN — HYDROMORPHONE HYDROCHLORIDE 0.5 MG: 2 INJECTION, SOLUTION INTRAMUSCULAR; INTRAVENOUS; SUBCUTANEOUS at 12:50

## 2024-07-17 RX ADMIN — DEXAMETHASONE SODIUM PHOSPHATE 10 MG: 4 INJECTION, SOLUTION INTRAMUSCULAR; INTRAVENOUS at 10:47

## 2024-07-17 RX ADMIN — FENTANYL CITRATE 50 MCG: 50 INJECTION, SOLUTION INTRAMUSCULAR; INTRAVENOUS at 10:46

## 2024-07-17 RX ADMIN — SODIUM CHLORIDE: 9 INJECTION, SOLUTION INTRAVENOUS at 12:37

## 2024-07-17 RX ADMIN — Medication 200 MCG: at 13:10

## 2024-07-17 RX ADMIN — Medication 100 MCG: at 12:25

## 2024-07-17 RX ADMIN — CEFAZOLIN 2000 MG: 2 INJECTION, POWDER, FOR SOLUTION INTRAMUSCULAR; INTRAVENOUS at 10:51

## 2024-07-17 RX ADMIN — Medication 50 MCG: at 12:07

## 2024-07-17 RX ADMIN — FENTANYL CITRATE 50 MCG: 50 INJECTION, SOLUTION INTRAMUSCULAR; INTRAVENOUS at 11:34

## 2024-07-17 RX ADMIN — DEXMEDETOMIDINE HYDROCHLORIDE 4 MCG: 100 INJECTION, SOLUTION INTRAVENOUS at 13:57

## 2024-07-17 RX ADMIN — TILMANOCEPT 500 MICRO CURIE: KIT at 11:15

## 2024-07-17 RX ADMIN — MIDAZOLAM 1 MG: 1 INJECTION INTRAMUSCULAR; INTRAVENOUS at 10:41

## 2024-07-17 RX ADMIN — SODIUM CHLORIDE: 9 INJECTION, SOLUTION INTRAVENOUS at 09:45

## 2024-07-17 RX ADMIN — MIDAZOLAM 1 MG: 1 INJECTION INTRAMUSCULAR; INTRAVENOUS at 10:38

## 2024-07-17 RX ADMIN — OXYCODONE 5 MG: 5 TABLET ORAL at 14:46

## 2024-07-17 RX ADMIN — PROPOFOL 130 MG: 10 INJECTION, EMULSION INTRAVENOUS at 10:42

## 2024-07-17 RX ADMIN — Medication 50 MCG: at 12:19

## 2024-07-17 RX ADMIN — ONDANSETRON 4 MG: 2 INJECTION INTRAMUSCULAR; INTRAVENOUS at 13:14

## 2024-07-17 RX ADMIN — Medication 50 MCG: at 13:05

## 2024-07-17 RX ADMIN — DEXMEDETOMIDINE HYDROCHLORIDE 4 MCG: 100 INJECTION, SOLUTION INTRAVENOUS at 14:04

## 2024-07-17 RX ADMIN — Medication 200 MCG: at 13:13

## 2024-07-17 RX ADMIN — LIDOCAINE HYDROCHLORIDE 100 MG: 20 INJECTION, SOLUTION INFILTRATION; PERINEURAL at 10:42

## 2024-07-17 RX ADMIN — ROCURONIUM BROMIDE 50 MG: 10 INJECTION, SOLUTION INTRAVENOUS at 10:42

## 2024-07-17 RX ADMIN — SUGAMMADEX 200 MG: 100 INJECTION, SOLUTION INTRAVENOUS at 13:54

## 2024-07-17 RX ADMIN — Medication 150 MCG: at 13:08

## 2024-07-17 ASSESSMENT — PAIN SCALES - GENERAL: PAINLEVEL_OUTOF10: 0

## 2024-07-17 ASSESSMENT — PAIN DESCRIPTION - LOCATION: LOCATION: BREAST

## 2024-07-17 ASSESSMENT — PAIN - FUNCTIONAL ASSESSMENT
PAIN_FUNCTIONAL_ASSESSMENT: 0-10
PAIN_FUNCTIONAL_ASSESSMENT: 0-10
PAIN_FUNCTIONAL_ASSESSMENT: NONE - DENIES PAIN

## 2024-07-17 ASSESSMENT — ENCOUNTER SYMPTOMS: SHORTNESS OF BREATH: 1

## 2024-07-17 ASSESSMENT — PAIN DESCRIPTION - DESCRIPTORS: DESCRIPTORS: SORE

## 2024-07-17 NOTE — PROGRESS NOTES
Received from OR - Unresponsive,simple mask @ 5 liters , x 2 J.P drains with scant clear brown drainage, semi fowlers, scds,surgical bra , vss.

## 2024-07-17 NOTE — INTERVAL H&P NOTE
H&P Update    The patient's most recent H&P, office notes, breast imaging, and pathology were reviewed.    Patient examined and laterality marked.    There has been no changes.  We will plan to proceed with a bilateral mastectomies with targeted right sentinel lymph node biopsy.    Ashley Ferrell MD

## 2024-07-17 NOTE — PROGRESS NOTES
Phase  2- Awake,room air 100%,consuming po fluid tolerated well, pain pill for c/o moderate surgical pain,vss.

## 2024-07-17 NOTE — OP NOTE
Operative Note      Postoperative Note    Tracy Thompson  YOB: 1955  [id number]    Pre-operative Diagnosis: T 2 N1 right breast cancer  Primary breast malignancy, right (HCC) [C50.911]    Post-operative Diagnosis: Same    Procedure: Injection of isosulfuran blue dye for lymphatic mapping, injection of radiopharmaceutical for lymphatic mapping, lymphatic mapping with neoprobe unit, right deep axillary selective lymph node dissection,bilateral mastectomy    Anesthesia: General    Surgeons/Assistants: Ashley Ferrell  Assistant: Venice Keenan    Estimated Blood Loss: 100     Drains:  15F round under mastectomy flaps bilaterally    Complications: None apparent at conclusion of procedure    Specimens: bilateral breast tissue, sentinel nodes (see below for details)    Findings: sentinel nodes, see below    Post-Op Condition: Stable    Disposition: to recovery room  Description of Procedure:   Ms. Thompson is a 69 y.o. woman with a clinical T2 N1  right breast cancer. She has completed neoadjuvant chemotherapy. She has elected to proceed with bilateral mastectomy and targeted selective lymph node dissection. Reconstruction will be revisited after radiation. She will not be undergoing immediate chest wall reconstruction at this time. The indications for the planned procedure, along with the potential benefits and risks which include but are not limited to the risk of anesthesia, bleeding, infection, possible failed operation, possible need for additional surgery pending final pathologic assessment, lymphedema, sensation changes, and unappealing cosmetics were reviewed. All questions were answered and she agrees to proceed.    Ms. Thompson was met by me in the preoperative area.  The surgical site was identified. The patient's surgical site was marked. Consent was obtained. The appropriate breast imaging was reviewed. A localizer TAG is placed in her prior positive right

## 2024-07-17 NOTE — DISCHARGE INSTRUCTIONS
Postoperative Instructions for Breast Surgery  Ashley Ferrell MD [address], [address]  [place] [address]  ([phone])    These are general guidelines to help assist in recovery after breast surgery. Please keep in mind all patients recover differently, so please call the office with any questions ([phone]).    General guidelines   Rest when you feel tired  You will have a surgical bra on when you wake up. Please wear this day and night until your postoperative appointment. It can be removed for laundering and for showers. This helps immobilize the breast to alleviate discomfort as well as maintain pressure to avoid postoperative seroma.  If you had a sentinel lymph node procedure, it is common to have an interval of blue urine and/or stool and blue skin changes of the breast.   Final pathology will take 3-5 days to result. The breast surgery office will call you with the results when they are known.    Pain management  You may feel mild to moderate discomfort when anesthesia wears off  You will be given a prescription for a narcotic pain medication  Some patients experience very little discomfort and they prefer not to use the narcotic  You may take Tylenol or extra strength Tylenol instead of the narcotic  Many narcotics also contained Tylenol so you should not take both  AVOID aspirin, fish oil, Excedrin, Motrin, ibuprofen, and other NSAIDS immediately after surgery for at least 48-72 hours.  Anticoagulation such as warfarin can typically resume 48 hours after surgery.  This should be discussed with your surgeon and prescribing physician.  Narcotic medication may cause constipation. Make sure to keep well hydrated, ambulate, and you may eat high-fiber foods to help avoid constipation. You may use over-the-counter medications for constipation.  You should not consume alcohol while taking narcotics  You should not drive while taking narcotics  Pain should improve, not worsen as days pass. If  point spout into the measuring cup and squeeze out the drainage into the measuring cup.  **Measure and record the date, time and amount of drainage. Take this with you to your doctor's appointment.  **Squeeze out all the air and close the JUDI by replacing the cap on the spout.  If you are unable to squeeze the bulb with your hand, lay the JUDI on a flat surface (being careful not to place any tension on the tubing.)  Flatten the bulb with the palm of your hand and close the spout.  **Secure the JUDI with a pin on the tab, being careful not to puncture a hold into the bulb.  You should secure the JUDI to your clothing with a pin to avoid accidentally pulling out the JUDI.  Be careful not to kink the tubing when you sit or lie down.  This will prevent the tube from draining.  **Discard the drainage in the toilet.  **Wash hands.    IF THE DRAIN WILL NOT REMAIN COMPRESSED AFTER EMPTYING AND SQUEEZING THERE IS A POSSIBILITY IT MAY HAVE PULLED OUT OF THE INCISION UNDER THE DRESSING.  YOU NEED TO NOTIFY YOUR DOCTOR IF THIS OCCURS.     ANESTHESIA DISCHARGE INSTRUCTIONS    Wear your seatbelt home.  You are under the influence of drugs-do not drink alcohol, drive, operate machinery, make any important decisions or sign any legal documents for 24 hours.  Children should not ride bikes, skate boards or play on gym sets for 24 hours after surgery.  A responsible adult needs to be with you for 24 hours.  You may experience lightheadedness, dizziness, or sleepiness following surgery.  Rest at home today- increase activity as tolerated.  Progress slowly to a regular diet unless your physician has instructed you otherwise. Drink plenty of water.  If persistent nausea and vomiting becomes a problem, call your physician.  Coughing, sore throat and muscle aches are other side effects of anesthesia, and should improve with time.  Do not drive or operate machinery while taking narcotics.  Females of childbearing potential and on hormonal birth

## 2024-07-17 NOTE — PROGRESS NOTES
Teaching/ education completed for home care including pain management, wound care,activity,safety precautions,JUDI drain  and infection control. Patient and daughter verbalized understanding.

## 2024-07-17 NOTE — ANESTHESIA POSTPROCEDURE EVALUATION
Department of Anesthesiology  Postprocedure Note    Patient: Tracy Thompson  MRN: [id number]  YOB: 1955  Date of evaluation: 7/17/2024    Procedure Summary       Date: 07/17/24 Room / Location: [address]    Anesthesia Start: 1038 Anesthesia Stop: 1422    Procedure: BILATERAL BREAST MASTECTOMY, TARGETED RIGHT SENTINEL LYMPH NODE BIOPSY,TECHNETIUM NINETY-NINE AND INJECTABLE BLUE DYE IN THE OPERATING ROOM, FROZEN SECTIONS (Bilateral: Breast) Diagnosis:       Primary breast malignancy, right (HCC)      (Primary breast malignancy, right (HCC) [C50.911])    Surgeons: Ashley Ferrell MD Responsible Provider: Brenda Guerrero MD    Anesthesia Type: general ASA Status: 2            Anesthesia Type: No value filed.    Denys Phase I: Denys Score: 5    Denys Phase II:      Anesthesia Post Evaluation    Patient location during evaluation: PACU  Airway patency: patent  Nausea & Vomiting: no vomiting  Cardiovascular status: hemodynamically stable  Respiratory status: acceptable  Hydration status: euvolemic  Multimodal analgesia pain management approach    No notable events documented.

## 2024-07-17 NOTE — PROGRESS NOTES
Updated family at this time per Dr Ferrell that \"right sided lymph nodes came back negative\" and that we are working on the left side and closing soon.

## 2024-07-17 NOTE — ANESTHESIA PRE PROCEDURE
Department of Anesthesiology  Preprocedure Note       Name:  Tracy Thompson   Age:  69 y.o.  :  1955                                          MRN:  [id number]         Date:  2024      Surgeon: Surgeon(s):  Ashley Ferrell MD    Procedure: Procedure(s):  BILATERAL BREAST MASTECTOMY, TARGETED RIGHT SENTINEL LYMPH NODE BIOPSY,TECHNETIUM NINETY-NINE AND INJECTABLE BLUE DYE IN THE OPERATING ROOM, FROZEN SECTIONS, POSSIBLE  RIGHT AXILLARY LYMPH NODE DISSECTION.    Medications prior to admission:   Prior to Admission medications    Medication Sig Start Date End Date Taking? Authorizing Provider   acetaminophen (TYLENOL) 325 MG tablet Take 2 tablets by mouth every 8 hours as needed for Pain (pain, fever) 3/26/24 4/2/24  Tina Wick MD   amLODIPine (NORVASC) 5 MG tablet Take 1 tablet by mouth daily Hold it for SBP<110 3/26/24   Tina Wick MD   metFORMIN (GLUCOPHAGE) 500 MG tablet Take 1 tablet by mouth 2 times daily (with meals) 3/5/24   Nancy Vivar MD   cetirizine (ZYRTEC) 10 MG tablet Take 1 tablet by mouth in the morning and at bedtime    Nancy Vivar MD   atorvastatin (LIPITOR) 20 MG tablet Take 1 tablet by mouth daily 10/24/22   Nancy Vivar MD   levothyroxine (SYNTHROID) 75 MCG tablet Take 1 tablet by mouth Daily 3/1/24   Nancy Vivar MD   omeprazole (PRILOSEC) 20 MG delayed release capsule Take 1 capsule by mouth Daily 10/24/22   Nancy Vivar MD   fluocinolone (DERMOTIC) 0.01 % OIL oil Place 5 drops in the affected ear(s) 2 times daily, for 7 to 14 days, as needed for dermatitis or itching.  If symptoms persists longer than 14 days, call office for appointment.  Patient not taking: Reported on 2024   Devon Soto MD       Current medications:    No current facility-administered medications for this encounter.     Current Outpatient Medications   Medication Sig Dispense Refill   • acetaminophen (TYLENOL) 325 MG tablet Take

## 2024-07-17 NOTE — PROGRESS NOTES
Discharge instructions reviewed with patient/responsible adult. All home medications have been reviewed, questions answered and patient and daughter verbalized understanding.  Discharge instructions signed and copies given. Patient discharged  per w/c with belongings.

## 2024-07-18 ENCOUNTER — TELEMEDICINE (OUTPATIENT)
Dept: SURGERY | Age: 69
End: 2024-07-18

## 2024-07-18 DIAGNOSIS — C50.911 PRIMARY BREAST MALIGNANCY, RIGHT (HCC): ICD-10-CM

## 2024-07-18 DIAGNOSIS — Z09 POSTOP CHECK: Primary | ICD-10-CM

## 2024-07-18 PROCEDURE — 99024 POSTOP FOLLOW-UP VISIT: CPT | Performed by: SURGERY

## 2024-07-22 ENCOUNTER — NURSE ONLY (OUTPATIENT)
Dept: SURGERY | Age: 69
End: 2024-07-22

## 2024-07-22 ENCOUNTER — TELEPHONE (OUTPATIENT)
Dept: SURGERY | Age: 69
End: 2024-07-22

## 2024-07-22 VITALS
BODY MASS INDEX: 25.44 KG/M2 | SYSTOLIC BLOOD PRESSURE: 105 MMHG | HEIGHT: 64 IN | WEIGHT: 149 LBS | HEART RATE: 74 BPM | RESPIRATION RATE: 16 BRPM | DIASTOLIC BLOOD PRESSURE: 68 MMHG

## 2024-07-22 DIAGNOSIS — Z51.89 VISIT FOR WOUND CHECK: Primary | ICD-10-CM

## 2024-07-22 NOTE — TELEPHONE ENCOUNTER
Patient callled said she sopke to Dr. Ferrell on Friday to come in the office today   To check the drainage from surgery   Please call the patient at   Home: [phone]     Opening at 10:15am this morning with Dr. Ferrell-   Spoke to nurse PARKER To schedule today with nurse visit.  Thank you

## 2024-07-23 NOTE — PROGRESS NOTES
Tracy Thompson (:  1955) is a 69 y.o. female,Established patient, here for evaluation of the following chief complaint(s):  Wound Check (Evaluation of JUDI drains )      S/P: 24 bilateral  mastectomies  with sentinel node biopsy for right breast cancer with Dr. Ferrell. Tracy called in wanting to have JUDI drains evaluated for leaking. This nurse evaluated bilateral JUDI drains are properly draining serosanguineous. Gel dressing a JUDI drain was coming off. I changed dressing with CHG impregnated foam disc and covered with tegaderm. Instructed to call me with any other questions or concerns.                An electronic signature was used to authenticate this note.    --TRAV ALEXANDER LPN

## 2024-07-23 NOTE — H&P
H&P Update    The patient's most recent H&P, office notes, breast imaging, and pathology were reviewed.    Patient examined and laterality marked.    There has been no changes.  We will plan to proceed with a bilateral mastectomy with targeted right axillary sentinel node biopsy with frozen sections and possible axillary dissection.    Ashley Ferrell MD

## 2024-07-23 NOTE — PROGRESS NOTES
PCP:  Medical Oncology: [facility]  Radiation:  Other: Prieto      [id number]  [id number]:  IIB right breast cancer     Ms. Thompson is a 69 y.o.-year-old woman who is now s/p bilateral total mastectomy with targeted sentinel lymph node biospy for right breast cancer.  She underwent this procedure on  7/17/2024  and tolerated it well.  She is doing quite well postoperatively and her pain is continuing to improve.      INTERVAL HISTORY:  On 11/17/2023 she underwent bilateral breast imaging.  There is a 2.5 cm hypoechoic mass at 7:00 as well as a 6 mm and 4 mm hypoechoic mass at 8:00 in the right breast.  The right axilla shows an enlarged lymph node.  Suspicious calcifications extending from the dominant mass are also noted.  The left breast was negative.  BI-RADS 4C.     On 12/7/2023 she underwent 2 site core needle biopsy of the right breast as well as right axillary biopsy.  Pathology was considered concordant. [id number] biopsy markers are  by about 5 cm.     Pathology of the right breast 7 o'clock position identified grade 3 invasive ductal carcinoma.  ER negative AK negative HER2 negative.  An open coil marker was placed.     Pathology of the right breast 8 o'clock position identified grade 3 invasive ductal carcinoma.  ER negative AK negative HER2 negative.  A butterfly marker was placed.     Pathology of the right axillary lymph node identified carcinoma involving a lymph node tissue.  ER negative AK negative HER2 positive.     On 5/16/2020 for her interval imaging was reviewed.  The dominant mass in the right breast containing a biopsy marker has decreased in size, now measuring 1.5 cm.  The small posterior lateral and superior mass at 8:00 has also decreased in size.  Calcifications appear similar to prior imaging.  There is a biopsy marker evident within the right axillary lymph node which appears decreased mammographically however has an abnormal appearance by ultrasound.       Pathology:  On

## 2024-07-29 ENCOUNTER — OFFICE VISIT (OUTPATIENT)
Dept: SURGERY | Age: 69
End: 2024-07-29

## 2024-07-29 VITALS
HEART RATE: 82 BPM | BODY MASS INDEX: 25.95 KG/M2 | SYSTOLIC BLOOD PRESSURE: 112 MMHG | OXYGEN SATURATION: 99 % | DIASTOLIC BLOOD PRESSURE: 80 MMHG | HEIGHT: 64 IN | WEIGHT: 152 LBS

## 2024-07-29 DIAGNOSIS — Z09 SURGICAL FOLLOW-UP CARE: ICD-10-CM

## 2024-07-29 DIAGNOSIS — Z09 POSTOP CHECK: Primary | ICD-10-CM

## 2024-07-29 DIAGNOSIS — C50.911 PRIMARY BREAST MALIGNANCY, RIGHT (HCC): ICD-10-CM

## 2024-07-29 PROCEDURE — 99024 POSTOP FOLLOW-UP VISIT: CPT | Performed by: SURGERY

## 2024-08-06 ENCOUNTER — NURSE ONLY (OUTPATIENT)
Dept: SURGERY | Age: 69
End: 2024-08-06

## 2024-08-06 VITALS
HEART RATE: 78 BPM | SYSTOLIC BLOOD PRESSURE: 118 MMHG | OXYGEN SATURATION: 98 % | WEIGHT: 153 LBS | RESPIRATION RATE: 16 BRPM | HEIGHT: 64 IN | DIASTOLIC BLOOD PRESSURE: 68 MMHG | BODY MASS INDEX: 26.12 KG/M2

## 2024-08-06 DIAGNOSIS — Z51.89 VISIT FOR WOUND CHECK: Primary | ICD-10-CM

## 2024-08-07 NOTE — PROGRESS NOTES
Tracy Thompson (:  1955) is a 69 y.o. female,Established patient, here for evaluation of the following chief complaint(s):  Wound Check      Assessment & Plan    2024 Per Dr. Ferrell she underwent bilateral mastectomy with targeted right sentinel lymph node biopsy.  Pathology of the left breast showed no diagnostic e abnormality.  Pathology of the right breast identified 1 cm of residual invasive ductal carcinoma.  Grade 3.  ER negative MS negative HER2 positive.  Margins were negative.  There was 0/3 lymph nodes involved with carcinoma.  This includes the prior positive node. [id number]     Tracy in today for bilateral JUDI drain removal. JUDI drain output has been 15ML on Left side and 8ML on the right side for 3 consecutive days. JUDI drains removed without complications. Dry dressing applied, Tracy will change daily for one week. Will call with any questions or concerns.             An electronic signature was used to authenticate this note.    --TRAV ALEXANDER LPN

## 2024-08-15 ENCOUNTER — OFFICE VISIT (OUTPATIENT)
Dept: PULMONOLOGY | Age: 69
End: 2024-08-15
Payer: MEDICARE

## 2024-08-15 VITALS
SYSTOLIC BLOOD PRESSURE: 102 MMHG | HEART RATE: 97 BPM | WEIGHT: 153.2 LBS | BODY MASS INDEX: 26.15 KG/M2 | OXYGEN SATURATION: 98 % | HEIGHT: 64 IN | DIASTOLIC BLOOD PRESSURE: 64 MMHG

## 2024-08-15 DIAGNOSIS — R93.89 ABNORMAL CT SCAN, CHEST: Primary | ICD-10-CM

## 2024-08-15 PROCEDURE — 99214 OFFICE O/P EST MOD 30 MIN: CPT | Performed by: INTERNAL MEDICINE

## 2024-08-15 PROCEDURE — 1123F ACP DISCUSS/DSCN MKR DOCD: CPT | Performed by: INTERNAL MEDICINE

## 2024-08-15 NOTE — PROGRESS NOTES
and right lower lobe which are new since March 2024  Finish 10 days of antibiotics.  Her symptoms are slowly improving.  Cough and sore throat has resolved but she still has muscle weakness with joint pains which are slowly getting better.  No metastatic disease in bones or brain.  Patient was also diagnosed with rheumatoid arthritis in June 2024 and was placed on prednisone with improvement in symptoms.  Currently she denies any pulmonary symptoms.  Did not get repeat CT chest.    2. Right breast cancer  Has finished 6 cycles of chemotherapy in April 2024 followed by bilateral mastectomies.  Follows with Dr Lozano      Return if symptoms worsen or fail to improve.       Tri Daniels MD  Pulmonary Critical Care and Sleep Medicine  Electronically signed by Tri Daniels MD on 8/15/2024 at 3:51 PM     This note was completed using dragon medical speech recognition software. Grammatical errors, random word insertions, pronoun errors and incomplete sentences are occasional consequences of this technology due to software limitations. If there are questions or concerns about the content of this note of information contained within the body of this dictation they should be addressed with the provider for clarification.

## 2024-10-28 NOTE — PROGRESS NOTES
Medical Oncology: [facility]  Radiation:  Other: Prieto        [id number]  [id number]:  IIB right breast cancer       Ms. Thompson is a 69 y.o.-year-old woman who initially presented to me with right breast cancer.  Since her last encounter Ms. Thompson has been doing moderately well.  Her adjuvant treatment has included radiation.  She has no new breast related concerns today.  She does experience some tightness and upper arm tenderness when elevating her left arm.        INTERVAL HISTORY:  On 11/17/2023 she underwent bilateral breast imaging.  There is a 2.5 cm hypoechoic mass at 7:00 as well as a 6 mm and 4 mm hypoechoic mass at 8:00 in the right breast.  The right axilla shows an enlarged lymph node.  Suspicious calcifications extending from the dominant mass are also noted.  The left breast was negative.  BI-RADS 4C.     On 12/7/2023 she underwent 2 site core needle biopsy of the right breast as well as right axillary biopsy.  Pathology was considered concordant. [id number] biopsy markers are  by about 5 cm.     Pathology of the right breast 7 o'clock position identified grade 3 invasive ductal carcinoma.  ER negative NH negative HER2 negative.  An open coil marker was placed.     Pathology of the right breast 8 o'clock position identified grade 3 invasive ductal carcinoma.  ER negative NH negative HER2 negative.  A butterfly marker was placed.     Pathology of the right axillary lymph node identified carcinoma involving a lymph node tissue.  ER negative NH negative HER2 positive.     On 5/16/2020 for her interval imaging was reviewed.  The dominant mass in the right breast containing a biopsy marker has decreased in size, now measuring 1.5 cm.  The small posterior lateral and superior mass at 8:00 has also decreased in size.  Calcifications appear similar to prior imaging.  There is a biopsy marker evident within the right axillary lymph node which appears decreased mammographically however has an

## 2024-11-04 ENCOUNTER — TELEPHONE (OUTPATIENT)
Dept: SURGERY | Age: 69
End: 2024-11-04

## 2024-11-04 ENCOUNTER — OFFICE VISIT (OUTPATIENT)
Dept: SURGERY | Age: 69
End: 2024-11-04
Payer: MEDICARE

## 2024-11-04 VITALS
HEART RATE: 97 BPM | OXYGEN SATURATION: 97 % | WEIGHT: 173 LBS | BODY MASS INDEX: 29.53 KG/M2 | RESPIRATION RATE: 16 BRPM | SYSTOLIC BLOOD PRESSURE: 115 MMHG | HEIGHT: 64 IN | DIASTOLIC BLOOD PRESSURE: 74 MMHG

## 2024-11-04 DIAGNOSIS — Z09 SURGICAL FOLLOW-UP CARE: ICD-10-CM

## 2024-11-04 DIAGNOSIS — Z85.3 PERSONAL HISTORY OF BREAST CANCER: Primary | ICD-10-CM

## 2024-11-04 DIAGNOSIS — Z08 ENCOUNTER FOR FOLLOW-UP SURVEILLANCE OF BREAST CANCER: ICD-10-CM

## 2024-11-04 DIAGNOSIS — Z85.3 ENCOUNTER FOR FOLLOW-UP SURVEILLANCE OF BREAST CANCER: ICD-10-CM

## 2024-11-04 DIAGNOSIS — Z12.39 SCREENING BREAST EXAMINATION: ICD-10-CM

## 2024-11-04 PROCEDURE — 1159F MED LIST DOCD IN RCRD: CPT | Performed by: SURGERY

## 2024-11-04 PROCEDURE — 1125F AMNT PAIN NOTED PAIN PRSNT: CPT | Performed by: SURGERY

## 2024-11-04 PROCEDURE — 99214 OFFICE O/P EST MOD 30 MIN: CPT | Performed by: SURGERY

## 2024-11-04 PROCEDURE — 1123F ACP DISCUSS/DSCN MKR DOCD: CPT | Performed by: SURGERY

## 2024-11-04 NOTE — TELEPHONE ENCOUNTER
LMOR to see if patient could come in at 130pm today per nurse. I asked her to call back and ask for me.

## 2025-01-16 ENCOUNTER — OFFICE VISIT (OUTPATIENT)
Dept: SURGERY | Age: 70
End: 2025-01-16
Payer: MEDICARE

## 2025-01-16 VITALS
DIASTOLIC BLOOD PRESSURE: 75 MMHG | WEIGHT: 177 LBS | SYSTOLIC BLOOD PRESSURE: 134 MMHG | BODY MASS INDEX: 30.22 KG/M2 | HEIGHT: 64 IN | RESPIRATION RATE: 16 BRPM | HEART RATE: 80 BPM | OXYGEN SATURATION: 98 %

## 2025-01-16 DIAGNOSIS — N64.89 SEROMA OF BREAST: ICD-10-CM

## 2025-01-16 DIAGNOSIS — Z85.3 PERSONAL HISTORY OF BREAST CANCER: Primary | ICD-10-CM

## 2025-01-16 DIAGNOSIS — Z09 SURGICAL FOLLOW-UP CARE: ICD-10-CM

## 2025-01-16 PROCEDURE — 1125F AMNT PAIN NOTED PAIN PRSNT: CPT | Performed by: SURGERY

## 2025-01-16 PROCEDURE — 1123F ACP DISCUSS/DSCN MKR DOCD: CPT | Performed by: SURGERY

## 2025-01-16 PROCEDURE — 99213 OFFICE O/P EST LOW 20 MIN: CPT | Performed by: SURGERY

## 2025-01-16 PROCEDURE — 1159F MED LIST DOCD IN RCRD: CPT | Performed by: SURGERY

## 2025-01-16 NOTE — PROGRESS NOTES
Medical Oncology: Blake  Radiation:  Other: Prieto        [id number]  [id number]:  IIB right breast cancer       Ms. Thompson is a 69 y.o.-year-old woman who initially presented to me with right breast cancer.  Since her last encounter Ms. Thompson has been doing moderately well.  Her adjuvant treatment has included radiation.  She has no new breast related concerns today.  She presents today with right chest wall swelling and a new small hubert on her right chest wall skin.      INTERVAL HISTORY:  On 11/17/2023 she underwent bilateral breast imaging.  There is a 2.5 cm hypoechoic mass at 7:00 as well as a 6 mm and 4 mm hypoechoic mass at 8:00 in the right breast.  The right axilla shows an enlarged lymph node.  Suspicious calcifications extending from the dominant mass are also noted.  The left breast was negative.  BI-RADS 4C.     On 12/7/2023 she underwent 2 site core needle biopsy of the right breast as well as right axillary biopsy.  Pathology was considered concordant. [id number] biopsy markers are  by about 5 cm.     Pathology of the right breast 7 o'clock position identified grade 3 invasive ductal carcinoma.  ER negative CO negative HER2 negative.  An open coil marker was placed.     Pathology of the right breast 8 o'clock position identified grade 3 invasive ductal carcinoma.  ER negative CO negative HER2 negative.  A butterfly marker was placed.     Pathology of the right axillary lymph node identified carcinoma involving a lymph node tissue.  ER negative CO negative HER2 positive.     On 5/16/2020 for her interval imaging was reviewed.  The dominant mass in the right breast containing a biopsy marker has decreased in size, now measuring 1.5 cm.  The small posterior lateral and superior mass at 8:00 has also decreased in size.  Calcifications appear similar to prior imaging.  There is a biopsy marker evident within the right axillary lymph node which appears decreased mammographically however has

## 2025-01-31 ENCOUNTER — TELEPHONE (OUTPATIENT)
Dept: SURGERY | Age: 70
End: 2025-01-31

## 2025-01-31 NOTE — TELEPHONE ENCOUNTER
Patient called and stated she has drainage sitting in her right boob. It's been this was since her last follow up appointment with . Patent states her right breast has been aching due to the drainage. Please call her back at [phone]

## 2025-02-07 NOTE — TELEPHONE ENCOUNTER
I spoke to Kris. Will schedule an appt from 2/10/25 @ 2:30 PM. Will call me back if patient is unable to come .     ThanksTangela

## 2025-02-07 NOTE — TELEPHONE ENCOUNTER
Called patient her mailbox is full unable to leave a message. Will call her daughter Kris.     Thanks, Tangela

## 2025-02-10 ENCOUNTER — OFFICE VISIT (OUTPATIENT)
Dept: SURGERY | Age: 70
End: 2025-02-10
Payer: MEDICARE

## 2025-02-10 VITALS
HEIGHT: 64 IN | HEART RATE: 90 BPM | WEIGHT: 176 LBS | SYSTOLIC BLOOD PRESSURE: 139 MMHG | BODY MASS INDEX: 30.05 KG/M2 | DIASTOLIC BLOOD PRESSURE: 79 MMHG | OXYGEN SATURATION: 98 % | RESPIRATION RATE: 16 BRPM

## 2025-02-10 DIAGNOSIS — Z09 SURGICAL FOLLOW-UP CARE: ICD-10-CM

## 2025-02-10 DIAGNOSIS — Z85.3 PERSONAL HISTORY OF BREAST CANCER: Primary | ICD-10-CM

## 2025-02-10 DIAGNOSIS — N64.89 SEROMA OF BREAST: ICD-10-CM

## 2025-02-10 PROCEDURE — 1123F ACP DISCUSS/DSCN MKR DOCD: CPT | Performed by: SURGERY

## 2025-02-10 PROCEDURE — 1159F MED LIST DOCD IN RCRD: CPT | Performed by: SURGERY

## 2025-02-10 PROCEDURE — 1125F AMNT PAIN NOTED PAIN PRSNT: CPT | Performed by: SURGERY

## 2025-02-10 PROCEDURE — 99213 OFFICE O/P EST LOW 20 MIN: CPT | Performed by: SURGERY

## 2025-02-10 PROCEDURE — 10160 PNXR ASPIR ABSC HMTMA BULLA: CPT | Performed by: SURGERY

## 2025-02-10 RX ORDER — LIDOCAINE HYDROCHLORIDE 10 MG/ML
5 INJECTION, SOLUTION INFILTRATION; PERINEURAL ONCE
Status: COMPLETED | OUTPATIENT
Start: 2025-02-10 | End: 2025-02-10

## 2025-02-10 RX ADMIN — LIDOCAINE HYDROCHLORIDE 3 ML: 10 INJECTION, SOLUTION INFILTRATION; PERINEURAL at 15:06

## 2025-02-10 NOTE — PROGRESS NOTES
on reconstruction in the future and therefore I would not be aggressive with drains or surgical intervention for recurring seroma.  We discussed managing her symptoms.  She is planning follow-up with her reconstructive surgeon later this week.      Signs/symptoms of malignancy were reviewed. She verbalizes understanding that she should notify our office if she identifies any abnormalities on self evaluation as it may require further workup.    I encouraged her to continue self breast evaluation.    Follow up surveillance was discussed.    Our plan at this time is to follow up with surgical breast oncology office as previously scheduled for a clinical breast exam.        All of the patient's questions were answered at this time however, she was encouraged to call the office with any further inquiries. There were 30 minutes of time were spent in preparation, direct patient contact, care coordination, documentation and/or activities otherwise related to this encounter. This excludes time spent in procedure.    Tracy Thompson  YOB: 1955  [id number]    Pre-operative Diagnosis: Right chest wall seroma    Post-operative Diagnosis: Same    Procedure: Ultrasound guided aspiration of right chest wall seroma    Anesthesia:  Local using 5 mL of 1% lidocaine with epinephrine    Surgeons/Assistants: Dr. Ashley Ferrell    Estimated Blood Loss: 1 mL    Complications: none apparant    Specimens: none sent    Findings: 76 mL of seroma fluid obtained    Description:  The indications for the planned procedure, along with the potential benefits and risks were reviewed.  All questions were answered and she agreed to proceed.    The patient was placed in the supine position.  The ultrasound was used with the 12 mHz transducer to re-identify the fluid collection. A lateral to medial approach was chosen.The right chest wall was prepped and draped in the normal sterile fashion using betadine solution. A time out

## 2025-04-03 ENCOUNTER — TELEPHONE (OUTPATIENT)
Dept: SURGERY | Age: 70
End: 2025-04-03

## 2025-04-03 ENCOUNTER — OFFICE VISIT (OUTPATIENT)
Dept: SURGERY | Age: 70
End: 2025-04-03

## 2025-04-03 VITALS
HEART RATE: 74 BPM | WEIGHT: 174 LBS | SYSTOLIC BLOOD PRESSURE: 117 MMHG | DIASTOLIC BLOOD PRESSURE: 68 MMHG | HEIGHT: 64 IN | BODY MASS INDEX: 29.71 KG/M2 | RESPIRATION RATE: 16 BRPM | OXYGEN SATURATION: 98 %

## 2025-04-03 DIAGNOSIS — Z85.3 PERSONAL HISTORY OF BREAST CANCER: Primary | ICD-10-CM

## 2025-04-03 DIAGNOSIS — N64.89 SEROMA OF BREAST: ICD-10-CM

## 2025-04-03 DIAGNOSIS — Z09 SURGICAL FOLLOW-UP CARE: ICD-10-CM

## 2025-04-03 NOTE — TELEPHONE ENCOUNTER
Patient's daughter Kris called about mother having Right Breast tenderness  On going for 2 days  Some swelling  No redness, no discharge    Please call daughter at [phone]

## 2025-04-03 NOTE — PROGRESS NOTES
Medical Oncology: Blake  Radiation:  Other: Prieto        [id number]  [id number]:  IIB right breast cancer       Ms. Thompson is a 69 y.o.-year-old woman who initially presented to me with right breast cancer.  Since her last encounter Ms. Thompson has been doing well.  Her adjuvant treatment has included radiation.  She has no new breast related concerns today.  She presents today with right chest wall swelling and slight soreness.        INTERVAL HISTORY:  On 11/17/2023 she underwent bilateral breast imaging.  There is a 2.5 cm hypoechoic mass at 7:00 as well as a 6 mm and 4 mm hypoechoic mass at 8:00 in the right breast.  The right axilla shows an enlarged lymph node.  Suspicious calcifications extending from the dominant mass are also noted.  The left breast was negative.  BI-RADS 4C.     On 12/7/2023 she underwent 2 site core needle biopsy of the right breast as well as right axillary biopsy.  Pathology was considered concordant. [id number] biopsy markers are  by about 5 cm.     Pathology of the right breast 7 o'clock position identified grade 3 invasive ductal carcinoma.  ER negative WA negative HER2 negative.  An open coil marker was placed.     Pathology of the right breast 8 o'clock position identified grade 3 invasive ductal carcinoma.  ER negative WA negative HER2 negative.  A butterfly marker was placed.     Pathology of the right axillary lymph node identified carcinoma involving a lymph node tissue.  ER negative WA negative HER2 positive.     On 5/16/2024 for her interval imaging was reviewed.  The dominant mass in the right breast containing a biopsy marker has decreased in size, now measuring 1.5 cm.  The small posterior lateral and superior mass at 8:00 has also decreased in size.  Calcifications appear similar to prior imaging.  There is a biopsy marker evident within the right axillary lymph node which appears decreased mammographically however has an abnormal appearance by

## 2025-04-04 ENCOUNTER — TELEPHONE (OUTPATIENT)
Dept: SURGERY | Age: 70
End: 2025-04-04

## 2025-04-04 NOTE — TELEPHONE ENCOUNTER
The patient's daughter called today regarding right sided breast/chest pain. Her daughter stated that she was unsure whether the pain located in the breast, or deeper in the chest. I informed the daughter that some discomfort is normal in her breast after being aspirated yesterday, as the area may still be tender. However, if the pain is deeper and in the chest, it is likely not breast related and would need to be evaluated by the ED or patient's PCP. The patient's daughter confirmed understanding, but neither the  staff or I spoke to the patient herself.     Please contact patient's daughter, Aixa, with any further information or suggestions.

## 2025-04-07 NOTE — TELEPHONE ENCOUNTER
Spoke to arian and Tracy doing better. Had an appt with Dr. CALDERON.  Will continue to monitor and call me with any questions or concerns.       Thanks, Tangela

## 2025-04-24 NOTE — PROGRESS NOTES
DATE 4/30/2025____ PLACE ____  [address]       TIME 0730____                                   __x__  [address]      ARRIVAL TIME _0600__                                                                             SURGEONS OFFICE TO GIVE ARRIVAL TIME ___                                    IF YOU HAVE NOT RECEIVED A TIME CONTACT YOUR SURGEON                                     THE FOLLOWING THINGS NEED TO BE DONE OR YOUR SURGERY WILL BE CANCELLED    NOTHING TO EAT OR DRINK AFTER MIDNIGHT THE NIGHT BEFORE. YOU MAY TAKE ONLY THE FOLLOWING MEDICATIONS WITH A SIP OF WATER ON THE MORNING OF YOUR SURGERY.  _gabapentin, amlodipine, omeprazole and zyrtec  _____________________________________________________________________________________________YOU MAY BRUSH YOUR TEETH.    2.   A HISTORY/PHYSICAL MUST BE COMPLETED AND DATED WITHIN 30 DAYS OF YOUR SURGERY BY YOUR PCP __                                                                                                                                                                                                 SURGEON x__                                                                                                                                                                                                 HOSPITALIST __    3.  THE FOLLOWING MUST BE DONE WITHIN 30 DAYS OF SURGERY. LAB __  EKG __ CHEST XRAY __ TO BE DONE BY ____  NOT APPICABLE___x_____    4.   IF YOU TAKE A LONG ACTING INSULIN AT NIGHT, CUT YOUR NORMAL DOSE IN HALF, AND TAKE NO DIABETIC MEDCATION IN THE MORNING.    5.   IF YOU TAKE A GLP-1 RECEPTOR (SUCH AS TRULICITY, MOUNJARO, OZEMPIC-WEEKLY), YOU MUST BE OFF x 7 DAYS. IF YOU TAKE A DAILY DOSE SUCH AS RYBELSUS,      YOU MUST STOP 24 HOURS PRIOR TO SURGERY. LAST DOSE_n/a_______    6.  IF YOU TAKE A SGLT2 INHIBITOR  (SUCH AS FARXIGA, JARDIANCE, INVOKANA OR SYNJARDY), YOU MUST STOP 3 DAYS PRIOR TO SURGERY. LAST DOSE___n/a____    7.  IF YOU TAKE A BLOOD

## 2025-04-28 NOTE — H&P
[place] [address]                            PREOPERATIVE H&P      PATIENT NAME: AGUEDA BANEGAS            : 1955  MED REC NO: [id number]                      ROOM:   ACCOUNT NO: [id number]                       ADMIT DATE: 2025  PROVIDER: Miguel Moreau MD      Date of surgery, .    DIAGNOSIS:  Absence of bilateral breasts.    PLANNED PROCEDURE:  Bilateral breast reconstruction using placement of tissue expander.    INDICATION:  This is a 68-year-old female who is seen in consultation after completion of her radiation treatment.  She completed the radiation treatment on . She had her mastectomy now.  Examination does reveal that the right side is slightly firmer, but still fairly soft. With a clear understanding of a much higher risk of capsule contracture, she would like to proceed with placement of tissue expander, clearly understanding limitations after postop radiation.  The issue of bleeding, infection, wound dehiscence, scarring, as well as the issue of asymmetry, capsular contraction, all problems with implant itself was discussed, and a detailed consent form regarding procedure can be found in chart.    PAST MEDICAL HISTORY:  Significant for high blood pressure and diabetes.  No history of cardiac, pulmonary, renal, GI, , endocrinological, or neurological.    ALLERGIES:  DENIES ANY DRUG ALLERGIES.      MEDICATIONS:  Takes no medications at this time.    SOCIAL HISTORY:  Nonsmoker.    SURGICAL HISTORY:  Bilateral mastectomy.    PHYSICAL EXAMINATION:  GENERAL: Reveals a pleasant, alert female, in no apparent distress.  VITAL SIGNS: Stable.  LUNGS:  Clear.  HEART: Regular rate and rhythm.  BREASTS: Showed a well-healed incision bilaterally from the mastectomy. Again, the right side as I noted a slightly firmer, but still fairly supple.  Remaining examination unremarkable.    PLAN:  Again is

## 2025-04-30 ENCOUNTER — ANESTHESIA (OUTPATIENT)
Dept: OPERATING ROOM | Age: 70
End: 2025-04-30
Payer: MEDICARE

## 2025-04-30 ENCOUNTER — HOSPITAL ENCOUNTER (OUTPATIENT)
Age: 70
Setting detail: OUTPATIENT SURGERY
Discharge: HOME | End: 2025-04-30
Attending: PLASTIC SURGERY | Admitting: PLASTIC SURGERY
Payer: MEDICARE

## 2025-04-30 ENCOUNTER — ANESTHESIA EVENT (OUTPATIENT)
Dept: OPERATING ROOM | Age: 70
End: 2025-04-30
Payer: MEDICARE

## 2025-04-30 VITALS
TEMPERATURE: 96.9 F | SYSTOLIC BLOOD PRESSURE: 111 MMHG | OXYGEN SATURATION: 95 % | WEIGHT: 173 LBS | HEART RATE: 81 BPM | BODY MASS INDEX: 29.53 KG/M2 | RESPIRATION RATE: 15 BRPM | DIASTOLIC BLOOD PRESSURE: 71 MMHG | HEIGHT: 64 IN

## 2025-04-30 DIAGNOSIS — Z90.13 STATUS POST BILATERAL MASTECTOMY: ICD-10-CM

## 2025-04-30 DIAGNOSIS — Z85.3 PERSONAL HISTORY OF MALIGNANT NEOPLASM OF BREAST: Primary | ICD-10-CM

## 2025-04-30 LAB
ANION GAP SERPL CALCULATED.3IONS-SCNC: 10 MMOL/L (ref 3–16)
BUN SERPL-MCNC: 9 MG/DL (ref 7–20)
CALCIUM SERPL-MCNC: 9.6 MG/DL (ref 8.3–10.6)
CHLORIDE SERPL-SCNC: 102 MMOL/L (ref 99–110)
CO2 SERPL-SCNC: 27 MMOL/L (ref 21–32)
CREAT SERPL-MCNC: 0.8 MG/DL (ref 0.6–1.2)
GFR SERPLBLD CREATININE-BSD FMLA CKD-EPI: 80 ML/MIN/{1.73_M2}
GLUCOSE BLD-MCNC: 124 MG/DL (ref 70–99)
GLUCOSE BLD-MCNC: 93 MG/DL (ref 70–99)
GLUCOSE SERPL-MCNC: 104 MG/DL (ref 70–99)
PERFORMED ON: ABNORMAL
PERFORMED ON: NORMAL
POTASSIUM SERPL-SCNC: 3.8 MMOL/L (ref 3.5–5.1)
SODIUM SERPL-SCNC: 139 MMOL/L (ref 136–145)

## 2025-04-30 PROCEDURE — 6360000002 HC RX W HCPCS: Performed by: ANESTHESIOLOGY

## 2025-04-30 PROCEDURE — 3600000012 HC SURGERY LEVEL 2 ADDTL 15MIN: Performed by: PLASTIC SURGERY

## 2025-04-30 PROCEDURE — 3700000000 HC ANESTHESIA ATTENDED CARE: Performed by: PLASTIC SURGERY

## 2025-04-30 PROCEDURE — 7100000011 HC PHASE II RECOVERY - ADDTL 15 MIN: Performed by: PLASTIC SURGERY

## 2025-04-30 PROCEDURE — 2500000003 HC RX 250 WO HCPCS: Performed by: PLASTIC SURGERY

## 2025-04-30 PROCEDURE — 80048 BASIC METABOLIC PNL TOTAL CA: CPT

## 2025-04-30 PROCEDURE — 7100000000 HC PACU RECOVERY - FIRST 15 MIN: Performed by: PLASTIC SURGERY

## 2025-04-30 PROCEDURE — 2580000003 HC RX 258: Performed by: PLASTIC SURGERY

## 2025-04-30 PROCEDURE — 6360000002 HC RX W HCPCS: Performed by: PLASTIC SURGERY

## 2025-04-30 PROCEDURE — 3700000001 HC ADD 15 MINUTES (ANESTHESIA): Performed by: PLASTIC SURGERY

## 2025-04-30 PROCEDURE — 2709999900 HC NON-CHARGEABLE SUPPLY: Performed by: PLASTIC SURGERY

## 2025-04-30 PROCEDURE — 2500000003 HC RX 250 WO HCPCS: Performed by: NURSE ANESTHETIST, CERTIFIED REGISTERED

## 2025-04-30 PROCEDURE — 7100000001 HC PACU RECOVERY - ADDTL 15 MIN: Performed by: PLASTIC SURGERY

## 2025-04-30 PROCEDURE — 3600000002 HC SURGERY LEVEL 2 BASE: Performed by: PLASTIC SURGERY

## 2025-04-30 PROCEDURE — 7100000010 HC PHASE II RECOVERY - FIRST 15 MIN: Performed by: PLASTIC SURGERY

## 2025-04-30 PROCEDURE — 6370000000 HC RX 637 (ALT 250 FOR IP): Performed by: ANESTHESIOLOGY

## 2025-04-30 PROCEDURE — C1789 PROSTHESIS, BREAST, IMP: HCPCS | Performed by: PLASTIC SURGERY

## 2025-04-30 PROCEDURE — 6360000002 HC RX W HCPCS: Performed by: NURSE ANESTHETIST, CERTIFIED REGISTERED

## 2025-04-30 DEVICE — IMPLANTABLE DEVICE: Type: IMPLANTABLE DEVICE | Site: BREAST | Status: FUNCTIONAL

## 2025-04-30 RX ORDER — METHOCARBAMOL 100 MG/ML
INJECTION, SOLUTION INTRAMUSCULAR; INTRAVENOUS
Status: DISCONTINUED | OUTPATIENT
Start: 2025-04-30 | End: 2025-04-30 | Stop reason: SDUPTHER

## 2025-04-30 RX ORDER — PROPOFOL 10 MG/ML
INJECTION, EMULSION INTRAVENOUS
Status: DISCONTINUED | OUTPATIENT
Start: 2025-04-30 | End: 2025-04-30 | Stop reason: SDUPTHER

## 2025-04-30 RX ORDER — MIDAZOLAM HYDROCHLORIDE 1 MG/ML
INJECTION, SOLUTION INTRAMUSCULAR; INTRAVENOUS
Status: DISCONTINUED | OUTPATIENT
Start: 2025-04-30 | End: 2025-04-30 | Stop reason: SDUPTHER

## 2025-04-30 RX ORDER — BUPIVACAINE HYDROCHLORIDE 5 MG/ML
INJECTION, SOLUTION EPIDURAL; INTRACAUDAL; PERINEURAL
Status: DISCONTINUED | OUTPATIENT
Start: 2025-04-30 | End: 2025-04-30 | Stop reason: ALTCHOICE

## 2025-04-30 RX ORDER — CEPHALEXIN 500 MG/1
500 CAPSULE ORAL 4 TIMES DAILY
Qty: 28 CAPSULE | Refills: 0 | Status: SHIPPED | OUTPATIENT
Start: 2025-04-30

## 2025-04-30 RX ORDER — OXYCODONE AND ACETAMINOPHEN 7.5; 325 MG/1; MG/1
1 TABLET ORAL EVERY 4 HOURS PRN
Qty: 25 TABLET | Refills: 0 | Status: SHIPPED | OUTPATIENT
Start: 2025-04-30 | End: 2025-05-07

## 2025-04-30 RX ORDER — SODIUM CHLORIDE 9 MG/ML
INJECTION, SOLUTION INTRAVENOUS PRN
Status: DISCONTINUED | OUTPATIENT
Start: 2025-04-30 | End: 2025-04-30 | Stop reason: HOSPADM

## 2025-04-30 RX ORDER — NALOXONE HYDROCHLORIDE 0.4 MG/ML
INJECTION, SOLUTION INTRAMUSCULAR; INTRAVENOUS; SUBCUTANEOUS PRN
Status: DISCONTINUED | OUTPATIENT
Start: 2025-04-30 | End: 2025-04-30 | Stop reason: HOSPADM

## 2025-04-30 RX ORDER — SODIUM CHLORIDE 0.9 % (FLUSH) 0.9 %
5-40 SYRINGE (ML) INJECTION EVERY 12 HOURS SCHEDULED
Status: DISCONTINUED | OUTPATIENT
Start: 2025-04-30 | End: 2025-04-30 | Stop reason: HOSPADM

## 2025-04-30 RX ORDER — ROCURONIUM BROMIDE 10 MG/ML
INJECTION, SOLUTION INTRAVENOUS
Status: DISCONTINUED | OUTPATIENT
Start: 2025-04-30 | End: 2025-04-30 | Stop reason: SDUPTHER

## 2025-04-30 RX ORDER — HYDROMORPHONE HYDROCHLORIDE 2 MG/ML
INJECTION, SOLUTION INTRAMUSCULAR; INTRAVENOUS; SUBCUTANEOUS
Status: DISCONTINUED | OUTPATIENT
Start: 2025-04-30 | End: 2025-04-30 | Stop reason: SDUPTHER

## 2025-04-30 RX ORDER — PROCHLORPERAZINE EDISYLATE 5 MG/ML
5 INJECTION INTRAMUSCULAR; INTRAVENOUS
Status: DISCONTINUED | OUTPATIENT
Start: 2025-04-30 | End: 2025-04-30 | Stop reason: HOSPADM

## 2025-04-30 RX ORDER — APREPITANT 40 MG/1
40 CAPSULE ORAL ONCE
Status: COMPLETED | OUTPATIENT
Start: 2025-04-30 | End: 2025-04-30

## 2025-04-30 RX ORDER — HYDRALAZINE HYDROCHLORIDE 20 MG/ML
10 INJECTION INTRAMUSCULAR; INTRAVENOUS
Status: DISCONTINUED | OUTPATIENT
Start: 2025-04-30 | End: 2025-04-30 | Stop reason: HOSPADM

## 2025-04-30 RX ORDER — DEXMEDETOMIDINE HYDROCHLORIDE 100 UG/ML
INJECTION, SOLUTION INTRAVENOUS
Status: DISCONTINUED | OUTPATIENT
Start: 2025-04-30 | End: 2025-04-30 | Stop reason: SDUPTHER

## 2025-04-30 RX ORDER — DEXAMETHASONE SODIUM PHOSPHATE 4 MG/ML
INJECTION, SOLUTION INTRA-ARTICULAR; INTRALESIONAL; INTRAMUSCULAR; INTRAVENOUS; SOFT TISSUE
Status: DISCONTINUED | OUTPATIENT
Start: 2025-04-30 | End: 2025-04-30 | Stop reason: SDUPTHER

## 2025-04-30 RX ORDER — FENTANYL CITRATE 50 UG/ML
INJECTION, SOLUTION INTRAMUSCULAR; INTRAVENOUS
Status: DISCONTINUED | OUTPATIENT
Start: 2025-04-30 | End: 2025-04-30 | Stop reason: SDUPTHER

## 2025-04-30 RX ORDER — SODIUM CHLORIDE 0.9 % (FLUSH) 0.9 %
5-40 SYRINGE (ML) INJECTION PRN
Status: DISCONTINUED | OUTPATIENT
Start: 2025-04-30 | End: 2025-04-30 | Stop reason: HOSPADM

## 2025-04-30 RX ORDER — OXYCODONE HYDROCHLORIDE 5 MG/1
5 TABLET ORAL ONCE
Refills: 0 | Status: COMPLETED | OUTPATIENT
Start: 2025-04-30 | End: 2025-04-30

## 2025-04-30 RX ORDER — HALOPERIDOL 5 MG/ML
1 INJECTION INTRAMUSCULAR
Status: DISCONTINUED | OUTPATIENT
Start: 2025-04-30 | End: 2025-04-30 | Stop reason: HOSPADM

## 2025-04-30 RX ORDER — CYCLOBENZAPRINE HCL 10 MG
10 TABLET ORAL 3 TIMES DAILY
Qty: 21 TABLET | Refills: 0 | Status: SHIPPED | OUTPATIENT
Start: 2025-04-30 | End: 2025-05-10

## 2025-04-30 RX ORDER — ONDANSETRON 2 MG/ML
INJECTION INTRAMUSCULAR; INTRAVENOUS
Status: DISCONTINUED | OUTPATIENT
Start: 2025-04-30 | End: 2025-04-30 | Stop reason: SDUPTHER

## 2025-04-30 RX ORDER — FENTANYL CITRATE 50 UG/ML
25 INJECTION, SOLUTION INTRAMUSCULAR; INTRAVENOUS EVERY 5 MIN PRN
Status: DISCONTINUED | OUTPATIENT
Start: 2025-04-30 | End: 2025-04-30 | Stop reason: HOSPADM

## 2025-04-30 RX ORDER — HYDROMORPHONE HYDROCHLORIDE 2 MG/ML
0.5 INJECTION, SOLUTION INTRAMUSCULAR; INTRAVENOUS; SUBCUTANEOUS EVERY 5 MIN PRN
Status: DISCONTINUED | OUTPATIENT
Start: 2025-04-30 | End: 2025-04-30 | Stop reason: HOSPADM

## 2025-04-30 RX ORDER — SODIUM CHLORIDE 9 MG/ML
INJECTION, SOLUTION INTRAVENOUS CONTINUOUS
Status: DISCONTINUED | OUTPATIENT
Start: 2025-04-30 | End: 2025-04-30 | Stop reason: HOSPADM

## 2025-04-30 RX ORDER — ALBUTEROL SULFATE 90 UG/1
2 INHALANT RESPIRATORY (INHALATION) EVERY 6 HOURS PRN
COMMUNITY

## 2025-04-30 RX ORDER — ACETAMINOPHEN 500 MG
1000 TABLET ORAL ONCE
Status: COMPLETED | OUTPATIENT
Start: 2025-04-30 | End: 2025-04-30

## 2025-04-30 RX ORDER — LIDOCAINE HYDROCHLORIDE 20 MG/ML
INJECTION, SOLUTION INFILTRATION; PERINEURAL
Status: DISCONTINUED | OUTPATIENT
Start: 2025-04-30 | End: 2025-04-30 | Stop reason: SDUPTHER

## 2025-04-30 RX ADMIN — DEXAMETHASONE SODIUM PHOSPHATE 8 MG: 4 INJECTION, SOLUTION INTRAMUSCULAR; INTRAVENOUS at 07:40

## 2025-04-30 RX ADMIN — APREPITANT 40 MG: 40 CAPSULE ORAL at 07:19

## 2025-04-30 RX ADMIN — LIDOCAINE HYDROCHLORIDE 100 MG: 20 INJECTION, SOLUTION INFILTRATION; PERINEURAL at 07:40

## 2025-04-30 RX ADMIN — CEFAZOLIN 2000 MG: 2 INJECTION, POWDER, FOR SOLUTION INTRAMUSCULAR; INTRAVENOUS at 07:46

## 2025-04-30 RX ADMIN — FENTANYL CITRATE 50 MCG: 50 INJECTION, SOLUTION INTRAMUSCULAR; INTRAVENOUS at 07:38

## 2025-04-30 RX ADMIN — ROCURONIUM BROMIDE 50 MG: 10 INJECTION INTRAVENOUS at 07:40

## 2025-04-30 RX ADMIN — MIDAZOLAM 2 MG: 1 INJECTION INTRAMUSCULAR; INTRAVENOUS at 07:30

## 2025-04-30 RX ADMIN — FENTANYL CITRATE 25 MCG: 50 INJECTION, SOLUTION INTRAMUSCULAR; INTRAVENOUS at 08:18

## 2025-04-30 RX ADMIN — ACETAMINOPHEN 1000 MG: 500 TABLET ORAL at 10:15

## 2025-04-30 RX ADMIN — FENTANYL CITRATE 25 MCG: 50 INJECTION, SOLUTION INTRAMUSCULAR; INTRAVENOUS at 07:46

## 2025-04-30 RX ADMIN — SUGAMMADEX 200 MG: 100 INJECTION, SOLUTION INTRAVENOUS at 08:34

## 2025-04-30 RX ADMIN — HYDROMORPHONE HYDROCHLORIDE 0.5 MG: 2 INJECTION, SOLUTION INTRAMUSCULAR; INTRAVENOUS; SUBCUTANEOUS at 09:31

## 2025-04-30 RX ADMIN — SODIUM CHLORIDE: 9 INJECTION, SOLUTION INTRAVENOUS at 08:29

## 2025-04-30 RX ADMIN — PROPOFOL 150 MG: 10 INJECTION, EMULSION INTRAVENOUS at 07:40

## 2025-04-30 RX ADMIN — METHOCARBAMOL 1000 MG: 100 INJECTION INTRAMUSCULAR; INTRAVENOUS at 08:25

## 2025-04-30 RX ADMIN — ONDANSETRON 4 MG: 2 INJECTION, SOLUTION INTRAMUSCULAR; INTRAVENOUS at 07:40

## 2025-04-30 RX ADMIN — SODIUM CHLORIDE: 9 INJECTION, SOLUTION INTRAVENOUS at 07:14

## 2025-04-30 RX ADMIN — DEXMEDETOMIDINE HYDROCHLORIDE 2 MCG: 100 INJECTION, SOLUTION INTRAVENOUS at 07:57

## 2025-04-30 RX ADMIN — OXYCODONE HYDROCHLORIDE 5 MG: 5 TABLET ORAL at 10:15

## 2025-04-30 RX ADMIN — HYDROMORPHONE HYDROCHLORIDE 0.2 MG: 2 INJECTION, SOLUTION INTRAMUSCULAR; INTRAVENOUS; SUBCUTANEOUS at 08:30

## 2025-04-30 ASSESSMENT — PAIN - FUNCTIONAL ASSESSMENT
PAIN_FUNCTIONAL_ASSESSMENT: 0-10
PAIN_FUNCTIONAL_ASSESSMENT: PREVENTS OR INTERFERES SOME ACTIVE ACTIVITIES AND ADLS
PAIN_FUNCTIONAL_ASSESSMENT: 0-10
PAIN_FUNCTIONAL_ASSESSMENT: 0-10

## 2025-04-30 ASSESSMENT — PAIN DESCRIPTION - LOCATION
LOCATION: BREAST
LOCATION: BREAST

## 2025-04-30 ASSESSMENT — PAIN DESCRIPTION - ORIENTATION: ORIENTATION: RIGHT;LEFT

## 2025-04-30 ASSESSMENT — PAIN SCALES - GENERAL
PAINLEVEL_OUTOF10: 10
PAINLEVEL_OUTOF10: 10

## 2025-04-30 ASSESSMENT — PAIN DESCRIPTION - DESCRIPTORS: DESCRIPTORS: SPASM;HEAVINESS

## 2025-04-30 NOTE — BRIEF OP NOTE
Brief Postoperative Note      Patient: Tracy Thompson  YOB: 1955  MRN: [id number]    Date of Procedure: 4/30/2025    Pre-Op Diagnosis Codes:      * Personal history of malignant neoplasm of breast [Z85.3]     * Acquired absence of breast and nipple, bilateral [Z90.13]    Post-Op Diagnosis: Same       Procedure(s):  BILATERAL FIRST STAGE BREAST RECONSTRUCTION WITH BILATERAL BREAST TISSUE EXPANDERS    Surgeon(s):  Miguel Moreau MD    Assistant:  First Assistant: Shashank Gómez RN    Anesthesia: General    Estimated Blood Loss (mL): Minimal    Complications: None    Specimens:   * No specimens in log *    Implants:  Implant Name Type Inv. Item Serial No.  Lot No. LRB No. Used Action   EXPANDER TISS GEL 7.1 CM PROJCT 14X13  CC MOD HT X - [id number]  EXPANDER TISS GEL 7.1 CM PROJCT 14X13  CC MOD HT X [id number] [employer]   1 Implanted   EXPANDER TISS GEL 7.1 CM PROJCT 14X13  CC MOD HT X - [id number]  EXPANDER TISS GEL 7.1 CM PROJCT 14X13  CC MOD HT X [id number] [employer]  Right 1 Implanted         Drains: * No LDAs found *    Findings:  Infection Present At Time Of Surgery (PATOS) (choose all levels that have infection present):  No infection present  Other Findings:     Electronically signed by Miguel Moreau MD on 4/30/2025 at 8:43 AM

## 2025-04-30 NOTE — H&P
I have reviewed the history and physical and examined the patient and find no relevant changes.    I have reviewed with the patient and/or family the risks, benefits, and alternatives to the procedure.    Patient has no known allergies.    Last recorded vitals:  /70   Pulse 88   Temp 98.5 °F (36.9 °C) (Temporal)   Resp 16   Ht 1.626 m (5' 4\")   Wt 78.5 kg (173 lb)   SpO2 96%   BMI 29.70 kg/m²     Past Surgical History:   Procedure Laterality Date    TEN NEEDLE BIOPSY LYMPH NODE SUPERFICIAL  12/7/2023    TEN NEEDLE BIOPSY LYMPH NODE SUPERFICIAL 12/7/2023 [facility] ULTRASOUND    TEN STEREO BREAST BX W LOC DEVICE 1ST LESION RIGHT Right 12/8/2023    [facility] STEROTACTIC LOC BREAST BIOPSY RIGHT [facility]    MASTECTOMY Bilateral 7/17/2024    BILATERAL BREAST MASTECTOMY, TARGETED RIGHT SENTINEL LYMPH NODE BIOPSY,TECHNETIUM NINETY-NINE AND INJECTABLE BLUE DYE IN THE OPERATING ROOM, FROZEN SECTIONS performed by Ashley Ferrell MD at [facility] ASC OR    PORT SURGERY N/A 12/27/2023    POWER PORT-A-CATHETER PLACEMENT performed by Romero Sweet MD at [facility] OR    US BREAST BIOPSY W LOC DEVICE 1ST LESION RIGHT Right 12/7/2023    US BREAST BIOPSY W LOC DEVICE 1ST LESION RIGHT 12/7/2023 [facility] ULTRASOUND    US BREAST BIOPSY W LOC DEVICE EACH ADDL LESION RIGHT Right 12/7/2023    US BREAST BIOPSY NEEDLE ADDITIONAL RIGHT 12/7/2023 [facility] ULTRASOUND    US PLACE BREAST LOC DEVICE 1ST LESION RIGHT Right 6/21/2024    US GUIDED NEEDLE LOC OF RIGHT BREAST 6/21/2024 [facility] ULTRASOUND       Prior to Admission medications    Medication Sig Start Date End Date Taking? Authorizing Provider   albuterol sulfate HFA (VENTOLIN HFA) 108 (90 Base) MCG/ACT inhaler Inhale 2 puffs into the lungs every 6 hours as needed for Wheezing   Yes Provider, MD Nancy   amLODIPine (NORVASC) 5 MG tablet Take 1 tablet by mouth daily Hold it for SBP<110 3/26/24  Yes Tina Wick MD   metFORMIN (GLUCOPHAGE) 500 MG tablet Take 1 tablet by

## 2025-04-30 NOTE — DISCHARGE INSTRUCTIONS
Keep head elevated, dressing dry, f/u 1 week/ Call Dr. Moreau for any problems and to schedule follow-up appointment #[phone]      GENERAL SURGERY DISCHARGE INSTRUCTIONS    Follow your surgeons instructions.  Follow up with your surgeon as directed.  Observe the operative area for signs of excessive bleeding.If needed apply pressure,elevate if able and contact your surgeon.  Observe the operative site for any signs of infection- such as increased pain,redness,fever greater than 101 degrees,swelling, foul odor or drainage.Contact your surgeon if any of these symptoms are present.  Keep operative site clean and dry.  Do not remove dressing unless instructed to by surgeon.  Avoid pulling,pushing or tugging to suture line.  If you become short of breath call your doctor or go to the ER.  Take medications as directed.  Pain medication should be taken with food.  Do not drive or operate machinery while taking narcotics.  For any problems or question call your surgeon.       ANESTHESIA DISCHARGE INSTRUCTIONS    Wear your seatbelt home.  You are under the influence of drugs-do not drink alcohol,drive,operate machinery,or make any important decisions or sign any legal documentsfor 24 hours  A responsible adult needs to be with you for 24 hours.  You may experience lightheadedness,dizziness,or sleepiness following surgery.  Rest at home today- increase activity as tolerated.  Progress slowly to a regular diet unless your physician has instructed you otherwise.Drink plenty of water.  If nausea becomes a problem call your physician.  Coughing,sore throat,and muscle aches are other side effects of anesthesia,and should improve with time.  Do not drive,operate machinery while taking narcotics.

## 2025-04-30 NOTE — ANESTHESIA PRE PROCEDURE
Department of Anesthesiology  Preprocedure Note       Name:  Tracy Thompson   Age:  69 y.o.  :  1955                                          MRN:  [id number]         Date:  2025      Surgeon: Surgeon(s):  Miguel Moreau MD    Procedure: Procedure(s):  BILATERAL FIRST STAGE BREAST RECONSTRUCTION WITH BILATERAL BREAST TISSUE EXPANDERS AND ALLODERM    Medications prior to admission:   Prior to Admission medications    Medication Sig Start Date End Date Taking? Authorizing Provider   albuterol sulfate HFA (VENTOLIN HFA) 108 (90 Base) MCG/ACT inhaler Inhale 2 puffs into the lungs every 6 hours as needed for Wheezing   Yes ProviderNancy MD   amLODIPine (NORVASC) 5 MG tablet Take 1 tablet by mouth daily Hold it for SBP<110 3/26/24  Yes Tina Wick MD   metFORMIN (GLUCOPHAGE) 500 MG tablet Take 1 tablet by mouth 2 times daily (with meals) 3/5/24  Yes ProviderNancy MD   cetirizine (ZYRTEC) 10 MG tablet Take 1 tablet by mouth in the morning and at bedtime   Yes Provider, MD Nancy   atorvastatin (LIPITOR) 20 MG tablet Take 1 tablet by mouth daily 10/24/22  Yes ProviderNancy MD   levothyroxine (SYNTHROID) 75 MCG tablet Take 1 tablet by mouth Daily 3/1/24  Yes Provider, MD Nancy   omeprazole (PRILOSEC) 20 MG delayed release capsule Take 1 capsule by mouth Daily 10/24/22  Yes ProviderNancy MD   fluocinolone (DERMOTIC) 0.01 % OIL oil Place 5 drops in the affected ear(s) 2 times daily, for 7 to 14 days, as needed for dermatitis or itching.  If symptoms persists longer than 14 days, call office for appointment.  Patient not taking: Reported on 2025   Devon Soto MD       Current medications:    Current Facility-Administered Medications   Medication Dose Route Frequency Provider Last Rate Last Admin   • ceFAZolin (ANCEF) 2,000 mg in sterile water 20 mL IV syringe  2,000 mg IntraVENous On Call to OR Miguel Moreau MD       • 0.9 % sodium chloride infusion

## 2025-04-30 NOTE — OP NOTE
[address] [address]                            OPERATIVE REPORT      PATIENT NAME: AGUEDA BANEGAS            : 1955  MED REC NO: [id number]                      ROOM: ASC OR  ACCOUNT NO: [id number]                       ADMIT DATE: 2025  PROVIDER: Miguel Moreau MD      DATE OF PROCEDURE:  2025    SURGEON:  Miguel Moreau MD    PREOPERATIVE DIAGNOSIS:  Absence of bilateral breasts after bilateral mastectomy with a history of breast carcinoma.    POSTOPERATIVE DIAGNOSIS:  Absence of bilateral breasts after bilateral mastectomy with a history of breast carcinoma.    PROCEDURES:  Bilateral first-stage breast reconstruction with placement of submuscular expander.    ANESTHESIA:  General.    BLOOD LOSS:  Minimal.    SPECIMEN:  We used an Allergan expander, reference [id number], serial number for left [id number], right side [id number].    INDICATION:  This is a 69-year-old black female seen in consultation after undergoing bilateral mastectomy and then radiation treatment on the right.  After discussing the options at length, I elected to proceed with definitive 2-stage tissue expander and placement of tissue expander.  The only issue was that she had a perforation on the right, which may make the expansion as well as the postop result less than ideal.  This was discussed at length with her and she elected to proceed.  Other than that, the issue of bleeding, infection, wound dehiscence, scarring as well as the issue of asymmetry, capsular contracture was discussed and consent was obtained.    DESCRIPTION OF PROCEDURE:  The patient was taken to the operating room on , placed in supine position at which time general anesthesia was obtained.  Chest was sterilely prepped and draped in usual fashion using ChloraPrep solution.  The previous mastectomy incision was then made laterally on the right side initially, we then identified  the lateral border of pectoralis muscle.  Subpectoral dissection was performed.  We also did do multiple capsulotomies to try to improve the postop expansion.  After confirmation of hemostasis, irrigation with Ancef and gentamicin, the expander placed appropriately oriented and then incision was then closed using 2-0 Vicryl and 3-0 Monocryl.  Again, this was performed as well on the left side as described above.  Again, we were able to obtain complete coverage of the expander itself.  We then placed some Steri-Strips, Telfa, Tegaderm dressing, placed a breast binder and the patient was taken to recovery room in satisfactory condition.  No complications were noted at the end of the procedure.  Sponge and instrument counts were entirely correct.  Instructed to keep the head elevated at all times.  Follow up in 1 week.  I did give a script for Percocet as well as Keflex and Flexeril.          TENA TELLEZ MD      D:  04/30/2025 08:59:59     T:  04/30/2025 09:55:24     [facility]/SARAH  Job #:  [id number]     Doc#:  [id number]

## 2025-04-30 NOTE — ANESTHESIA POSTPROCEDURE EVALUATION
Department of Anesthesiology  Postprocedure Note    Patient: Tracy Thompson  MRN: [id number]  YOB: 1955  Date of evaluation: 4/30/2025    Procedure Summary       Date: 04/30/25 Room / Location: [address]    Anesthesia Start: 0730 Anesthesia Stop: 0857    Procedure: BILATERAL FIRST STAGE BREAST RECONSTRUCTION WITH BILATERAL BREAST TISSUE EXPANDERS (Bilateral: Breast) Diagnosis:       Personal history of malignant neoplasm of breast      Acquired absence of breast and nipple, bilateral      (Personal history of malignant neoplasm of breast [Z85.3])      (Acquired absence of breast and nipple, bilateral [Z90.13])    Surgeons: Miguel Moreau MD Responsible Provider: Mina Shea MD    Anesthesia Type: general ASA Status: 2            Anesthesia Type: No value filed.    Denys Phase I: Denys Score: 9    Denys Phase II: Denys Score: 10    Anesthesia Post Evaluation    Patient location during evaluation: PACU  Patient participation: complete - patient participated  Level of consciousness: awake  Pain score: 2  Airway patency: patent  Cardiovascular status: blood pressure returned to baseline  Respiratory status: acceptable  Hydration status: euvolemic  Pain management: adequate    No notable events documented.

## 2025-07-01 ENCOUNTER — APPOINTMENT (OUTPATIENT)
Dept: CT IMAGING | Age: 70
End: 2025-07-01
Payer: MEDICARE

## 2025-07-01 ENCOUNTER — HOSPITAL ENCOUNTER (EMERGENCY)
Age: 70
Discharge: HOME | End: 2025-07-02
Attending: EMERGENCY MEDICINE
Payer: MEDICARE

## 2025-07-01 VITALS
TEMPERATURE: 98 F | SYSTOLIC BLOOD PRESSURE: 103 MMHG | WEIGHT: 166.2 LBS | OXYGEN SATURATION: 100 % | HEIGHT: 67 IN | RESPIRATION RATE: 16 BRPM | DIASTOLIC BLOOD PRESSURE: 73 MMHG | BODY MASS INDEX: 26.09 KG/M2 | HEART RATE: 78 BPM

## 2025-07-01 DIAGNOSIS — N64.4 PAIN OF RIGHT BREAST: Primary | ICD-10-CM

## 2025-07-01 DIAGNOSIS — T85.79XA: ICD-10-CM

## 2025-07-01 LAB
ALBUMIN SERPL-MCNC: 3.6 G/DL (ref 3.4–5)
ALBUMIN/GLOB SERPL: 0.7 {RATIO} (ref 1.1–2.2)
ALP SERPL-CCNC: 145 U/L (ref 40–129)
ALT SERPL-CCNC: 18 U/L (ref 10–40)
ALT SERPL-CCNC: ABNORMAL U/L (ref 10–40)
ANION GAP SERPL CALCULATED.3IONS-SCNC: 13 MMOL/L (ref 3–16)
AST SERPL-CCNC: 46 U/L (ref 15–37)
BASOPHILS # BLD: 0 K/UL (ref 0–0.2)
BASOPHILS NFR BLD: 0.5 %
BILIRUB SERPL-MCNC: 0.3 MG/DL (ref 0–1)
BUN SERPL-MCNC: 8 MG/DL (ref 7–20)
CALCIUM SERPL-MCNC: 10.2 MG/DL (ref 8.3–10.6)
CHLORIDE SERPL-SCNC: 97 MMOL/L (ref 99–110)
CO2 SERPL-SCNC: 21 MMOL/L (ref 21–32)
CREAT SERPL-MCNC: 0.8 MG/DL (ref 0.6–1.2)
DEPRECATED RDW RBC AUTO: 15.8 % (ref 12.4–15.4)
EOSINOPHIL # BLD: 0 K/UL (ref 0–0.6)
EOSINOPHIL NFR BLD: 0.4 %
GFR SERPLBLD CREATININE-BSD FMLA CKD-EPI: 80 ML/MIN/{1.73_M2}
GLUCOSE SERPL-MCNC: 134 MG/DL (ref 70–99)
HCT VFR BLD AUTO: 35.3 % (ref 36–48)
HGB BLD-MCNC: 12.1 G/DL (ref 12–16)
LYMPHOCYTES # BLD: 1.3 K/UL (ref 1–5.1)
LYMPHOCYTES NFR BLD: 17.1 %
MCH RBC QN AUTO: 34.2 PG (ref 26–34)
MCHC RBC AUTO-ENTMCNC: 34.3 G/DL (ref 31–36)
MCV RBC AUTO: 99.7 FL (ref 80–100)
MONOCYTES # BLD: 0.8 K/UL (ref 0–1.3)
MONOCYTES NFR BLD: 11 %
NEUTROPHILS # BLD: 5.5 K/UL (ref 1.7–7.7)
NEUTROPHILS NFR BLD: 71 %
PLATELET # BLD AUTO: 270 K/UL (ref 135–450)
PMV BLD AUTO: 7 FL (ref 5–10.5)
POTASSIUM SERPL-SCNC: 3.4 MMOL/L (ref 3.5–5.1)
POTASSIUM SERPL-SCNC: ABNORMAL MMOL/L (ref 3.5–5.1)
PROT SERPL-MCNC: 8.5 G/DL (ref 6.4–8.2)
RBC # BLD AUTO: 3.54 M/UL (ref 4–5.2)
SODIUM SERPL-SCNC: 131 MMOL/L (ref 136–145)
WBC # BLD AUTO: 7.7 K/UL (ref 4–11)

## 2025-07-01 PROCEDURE — 85025 COMPLETE CBC W/AUTO DIFF WBC: CPT

## 2025-07-01 PROCEDURE — 6360000002 HC RX W HCPCS: Performed by: EMERGENCY MEDICINE

## 2025-07-01 PROCEDURE — 96372 THER/PROPH/DIAG INJ SC/IM: CPT

## 2025-07-01 PROCEDURE — 99285 EMERGENCY DEPT VISIT HI MDM: CPT

## 2025-07-01 PROCEDURE — 6360000004 HC RX CONTRAST MEDICATION: Performed by: EMERGENCY MEDICINE

## 2025-07-01 PROCEDURE — 80053 COMPREHEN METABOLIC PANEL: CPT

## 2025-07-01 RX ORDER — KETOROLAC TROMETHAMINE 15 MG/ML
15 INJECTION, SOLUTION INTRAMUSCULAR; INTRAVENOUS ONCE
Status: COMPLETED | OUTPATIENT
Start: 2025-07-01 | End: 2025-07-02

## 2025-07-01 RX ORDER — MORPHINE SULFATE 4 MG/ML
4 INJECTION, SOLUTION INTRAMUSCULAR; INTRAVENOUS ONCE
Status: COMPLETED | OUTPATIENT
Start: 2025-07-01 | End: 2025-07-01

## 2025-07-01 RX ADMIN — IOHEXOL 75 ML: 350 INJECTION, SOLUTION INTRAVENOUS at 23:37

## 2025-07-01 RX ADMIN — MORPHINE SULFATE 4 MG: 4 INJECTION INTRAVENOUS at 23:52

## 2025-07-01 ASSESSMENT — PAIN DESCRIPTION - LOCATION: LOCATION: BREAST

## 2025-07-01 ASSESSMENT — PAIN DESCRIPTION - ORIENTATION: ORIENTATION: RIGHT

## 2025-07-01 ASSESSMENT — PAIN SCALES - GENERAL: PAINLEVEL_OUTOF10: 10

## 2025-07-02 ENCOUNTER — APPOINTMENT (OUTPATIENT)
Dept: CT IMAGING | Age: 70
End: 2025-07-02
Payer: MEDICARE

## 2025-07-02 PROCEDURE — 96365 THER/PROPH/DIAG IV INF INIT: CPT

## 2025-07-02 PROCEDURE — 6370000000 HC RX 637 (ALT 250 FOR IP): Performed by: EMERGENCY MEDICINE

## 2025-07-02 PROCEDURE — 71260 CT THORAX DX C+: CPT

## 2025-07-02 PROCEDURE — 2580000003 HC RX 258: Performed by: EMERGENCY MEDICINE

## 2025-07-02 PROCEDURE — 96375 TX/PRO/DX INJ NEW DRUG ADDON: CPT

## 2025-07-02 PROCEDURE — 6360000002 HC RX W HCPCS: Performed by: EMERGENCY MEDICINE

## 2025-07-02 RX ORDER — POTASSIUM CHLORIDE 1500 MG/1
40 TABLET, EXTENDED RELEASE ORAL ONCE
Status: COMPLETED | OUTPATIENT
Start: 2025-07-02 | End: 2025-07-02

## 2025-07-02 RX ORDER — KETOROLAC TROMETHAMINE 10 MG/1
10 TABLET, FILM COATED ORAL EVERY 6 HOURS PRN
Qty: 20 TABLET | Refills: 0 | Status: SHIPPED | OUTPATIENT
Start: 2025-07-02

## 2025-07-02 RX ADMIN — POTASSIUM CHLORIDE 40 MEQ: 1500 TABLET, EXTENDED RELEASE ORAL at 02:55

## 2025-07-02 RX ADMIN — VANCOMYCIN HYDROCHLORIDE 1000 MG: 1 INJECTION, POWDER, LYOPHILIZED, FOR SOLUTION INTRAVENOUS at 02:25

## 2025-07-02 RX ADMIN — KETOROLAC TROMETHAMINE 15 MG: 15 INJECTION, SOLUTION INTRAMUSCULAR; INTRAVENOUS at 02:06

## 2025-07-02 NOTE — ED PROVIDER NOTES
EMERGENCY MEDICINE PROVIDER NOTE    Patient Identification  Pt Name: Tracy Thompson  MRN: [id number]  Birthdate 1955  Date of evaluation: 7/1/2025  Provider: Thomas Thomas MD  PCP: Juan Jacinto PA-C    Chief Complaint  Expander filled now pain (Patient is c/o right breast/ rib pain has expanders in place fluid removed; just finished treated about 1.5 month ago )      HPI  (History provided by patient)  This is a 70 y.o. female who was brought in by self for pain to her right breast/chest wall.  Patient had breast expanders placed about a month and a half ago as part of breast reconstructive surgery after mastectomy.  Patient states she has had pain in the area ever since, which is gradually worsened over time.  Her surgeon has been removing fluid from the breast expanders.  Despite this, patient states she has had increased pain.  She most recently had fluid this morning, with pain much worse now than it was earlier today.  She denies fever, chills, purulent drainage from her breast or chest wall tissues, redness or rash, and other symptoms.  Pain is worse when she takes a deep breath, but she is otherwise not short of breath.  Pain is localized to the area of her reconstructed breast..     I have reviewed the following nursing documentation:  Allergies: Patient has no known allergies.    Past medical history:   Past Medical History:   Diagnosis Date    Arthritis     rheumatoid arthritis    Diabetes mellitus (HCC)     Hypertension     Thyroid disease      Past surgical history:   Past Surgical History:   Procedure Laterality Date    BREAST ENHANCEMENT SURGERY Bilateral 4/30/2025    BILATERAL FIRST STAGE BREAST RECONSTRUCTION WITH BILATERAL BREAST TISSUE EXPANDERS performed by Miguel Moreau MD at [facility] OR    TEN NEEDLE BIOPSY LYMPH NODE SUPERFICIAL  12/7/2023    TEN NEEDLE BIOPSY LYMPH NODE SUPERFICIAL 12/7/2023 [facility] ULTRASOUND    TEN STEREO BREAST BX W LOC DEVICE 1ST LESION RIGHT Right 12/8/2023     history, I was concerned for a soft tissue infection and/or abscess related to her breast tissue expander.  However, her exam was not consistent with this, showing no erythema, no induration, no warmth, and no fluctuance.  There was also no drainage.  However, given her history and increased pain, I ordered a CT to best evaluate for this.  Fortunately, CT does not show clear evidence of infection.  However, there was a rim of fluid and a couple foci of gas in the right breast tissue, suggesting this was still a possibility.  It seemed less likely as patient was afebrile, had no soft tissue signs of infection, and had a normal white count.  I discussed the above findings with Dr. Miguel Moreau, her surgeon, who also drained fluid from the breast expander when she saw him at his office earlier today in hopes of decreasing the pain.  At this point, he advised me there is little bit can be done of the taking out the expander.  He thinks it is unlikely she is infected, but had prescribed antibiotics for her today given the possibility.  He also advised me to give IV antibiotics prior to discharge.  Reevaluation, the patient's pain was improved with treatment using morphine and Toradol.  However, it was still present.  I had a thorough discussion with the patient and her family regarding the above findings and plan, including my discussion with Dr. Moreau.  I also advised her of his recommendations regarding her options.  Given her normal vitals and reassuring workup, she is safe for discharge home.  We have her discussion regarding signs and symptoms for which she should return to the emergency department.  I have also prescribed pain medication for home.  Patient acknowledges understanding of all the above and is in agreement.      Final Impression  1. Pain of right breast    2. Possible infection of breast tissue expander, initial encounter        Blood pressure 103/73, pulse 78, temperature 98 °F (36.7 °C), resp. rate 16,

## (undated) DEVICE — SUTURE PERMAHAND SZ 3-0 L18IN NONABSORBABLE BLK L26MM SH C013D

## (undated) DEVICE — C-ARM: Brand: UNBRANDED

## (undated) DEVICE — SYRINGE, LUER LOCK, 10ML: Brand: MEDLINE

## (undated) DEVICE — PLASMABLADE X PS210-030S-LIGHT 3.0SL: Brand: PLASMABLADE™ X

## (undated) DEVICE — GLOVE SURG SZ 6.5 L11.2IN FNGR THK9.8MIL STRW LTX POLYMER

## (undated) DEVICE — SHEET,DRAPE,53X77,STERILE: Brand: MEDLINE

## (undated) DEVICE — BINDER ABD 4 PANEL LG 12 IN 46-62 IN UNISX LINING ELASTIC

## (undated) DEVICE — SUTURE VICRYL + SZ 3-0 L27IN ABSRB UD L26MM SH 1/2 CIR VCP416H

## (undated) DEVICE — APPLICATOR MEDICATED 26 CC SOLUTION HI LT ORNG CHLORAPREP

## (undated) DEVICE — RIGID LIGHT HANDLE: Brand: CARDINAL HEALTH

## (undated) DEVICE — LIQUIBAND RAPID ADHESIVE 36/CS 0.8ML: Brand: MEDLINE

## (undated) DEVICE — INTENDED FOR TISSUE SEPARATION, AND OTHER PROCEDURES THAT REQUIRE A SHARP SURGICAL BLADE TO PUNCTURE OR CUT.: Brand: BARD-PARKER ® STAINLESS STEEL BLADES

## (undated) DEVICE — MAJOR SET UP PK

## (undated) DEVICE — GLOVE SURG SZ 7 L12IN THK7.5MIL DK GRN LTX FREE MSG6570] MEDLINE INDUSTRIES INC]

## (undated) DEVICE — SUTURE VICRYL + SZ 3-0 L18IN ABSRB UD SH 1/2 CIR TAPERCUT NDL VCP864D

## (undated) DEVICE — BLADE ES ELASTOMERIC COAT INSUL DURABLE BEND UPTO 90DEG

## (undated) DEVICE — SPONGE LAP W18XL18IN WHT COT 4 PLY FLD STRUNG RADPQ DISP ST 2 PER PACK

## (undated) DEVICE — SOLUTION IRRIG 1000ML 0.9% SOD CHL USP POUR PLAS BTL

## (undated) DEVICE — ELECTRODE PT RET AD L9FT HI MOIST COND ADH HYDRGEL CORDED

## (undated) DEVICE — SUTURE VICRYL + SZ 2-0 L27IN ABSRB WHT SH 1/2 CIR TAPERCUT VCP417H

## (undated) DEVICE — DRAPE,CHEST,FENES,15X10,STERIL: Brand: MEDLINE

## (undated) DEVICE — INTENDED USE FOR SURGICAL MARKING ON INTACT SKIN, ALSO PROVIDES A PERMANENT METHOD OF IDENTIFYING OBJECTS IN THE OPERATING ROOM: Brand: WRITESITE® PLUS MINI PREP RESISTANT MARKER

## (undated) DEVICE — DECANTER FLD 9IN ST BG FOR ASEP TRNSF OF FLD

## (undated) DEVICE — GOWN SIRUS NONREIN XL W/TWL: Brand: MEDLINE INDUSTRIES, INC.

## (undated) DEVICE — SYRINGE MEDICAL 3ML CLEAR PLASTIC STANDARD NON CONTROL LUERLOCK TIP DISPOSABLE

## (undated) DEVICE — YANKAUER,BULB TIP,W/O VENT,RIGID,STERILE: Brand: MEDLINE

## (undated) DEVICE — SYRINGE MED 5ML STD CLR PLAS LUERLOCK TIP N CTRL DISP

## (undated) DEVICE — HYPODERMIC SAFETY NEEDLE: Brand: MAGELLAN

## (undated) DEVICE — NEEDLE,22GX1.5",REG,BEVEL: Brand: MEDLINE

## (undated) DEVICE — GUIDEWIRE ENDOSCP ANGLED 8 CM 0.035 INX150 CM BENT ZIPWIRE

## (undated) DEVICE — SYRINGE MED 50ML LUERLOCK TIP

## (undated) DEVICE — SUTURE MCRYL + SZ 4-0 L27IN ABSRB UD L19MM PS-2 3/8 CIR MCP426H

## (undated) DEVICE — TOWEL,OR,DSP,ST,BLUE,STD,4/PK,20PK/CS: Brand: MEDLINE

## (undated) DEVICE — PROBE SET W/DRAPE

## (undated) DEVICE — STAPLER SKIN H3.9MM WIRE DIA0.58MM CRWN 6.9MM 35 STPL ROT

## (undated) DEVICE — TOWEL,OR,DSP,ST,BLUE,DLX,10/PK,8PK/CS: Brand: MEDLINE

## (undated) DEVICE — SOLUTION IRRIG 500ML 0.9% SOD CHLO USP POUR PLAS BTL

## (undated) DEVICE — 3M™ IOBAN™ 2 ANTIMICROBIAL INCISE DRAPE 6650EZ: Brand: IOBAN™ 2

## (undated) DEVICE — GAUZE,SPONGE,4"X4",8PLY,STRL,LF,10/TRAY: Brand: MEDLINE

## (undated) DEVICE — SUTURE MONOCRYL + SZ 4-0 L27IN ABSRB UD L19MM PS-2 3/8 CIR MCP426H

## (undated) DEVICE — MAJOR SET UP: Brand: MEDLINE INDUSTRIES, INC.

## (undated) DEVICE — APPLIER CLP L9.38IN M LIG TI DISP STR RNG HNDL LIGACLP

## (undated) DEVICE — SYRINGE MED 10ML TRNSLUC BRL PLUNG BLK MRK POLYPR CTRL

## (undated) DEVICE — GLOVE SURG SZ 65 THK91MIL LTX FREE SYN POLYISOPRENE

## (undated) DEVICE — TUBING, SUCTION, 1/4" X 10', STRAIGHT: Brand: MEDLINE

## (undated) DEVICE — SUTURE ETHIBOND EXCEL SZ 2-0 L36IN NONABSORBABLE GRN L26MM SH X523H

## (undated) DEVICE — Device: Brand: MEDEX

## (undated) DEVICE — DRAPE,T,LAPARO,TRANS,STERILE: Brand: MEDLINE

## (undated) DEVICE — 3 ML SYRINGE LUER-LOCK TIP: Brand: MONOJECT

## (undated) DEVICE — SPONGE DRN W4XL4IN RAYON/POLYESTER 6 PLY NONWOVEN PRECUT 2 PER PK

## (undated) DEVICE — DRAIN SURG 15FR RND FULL FLUT

## (undated) DEVICE — BLANKET WRM W40.2XL55.9IN IORT LO BODY + MISTRAL AIR

## (undated) DEVICE — MASTISOL ADHESIVE LIQ 2/3ML

## (undated) DEVICE — SUTURE NONABSORBABLE MONOFILAMENT 3-0 PS-1 18 IN BLK ETHILON 1663H

## (undated) DEVICE — MERCY FAIRFIELD TURNOVER KIT: Brand: MEDLINE INDUSTRIES, INC.

## (undated) DEVICE — SUTURE PERMAHAND SZ 2-0 L12X18IN NONABSORBABLE BLK SILK A185H

## (undated) DEVICE — PAD N ADH W3XL4IN POLY COT SFT PERF FLM EASILY CUT ABSRB

## (undated) DEVICE — PAD,NON-ADHERENT,3X8,STERILE,LF,1/PK: Brand: MEDLINE

## (undated) DEVICE — MASC TURNOVER KIT: Brand: MEDLINE INDUSTRIES, INC.

## (undated) DEVICE — STRIP,CLOSURE,WOUND,MEDI-STRIP,1/2X4: Brand: MEDLINE

## (undated) DEVICE — TUBING BRST PMP L9FT DISP LAMIS

## (undated) DEVICE — COVER LT HNDL BLU PLAS

## (undated) DEVICE — PROVE COVER: Brand: UNBRANDED

## (undated) DEVICE — 3M™ TEGADERM™ TRANSPARENT FILM DRESSING FRAME STYLE, 1624W, 2-3/8 IN X 2-3/4 IN (6 CM X 7 CM), 100/CT 4CT/CASE: Brand: 3M™ TEGADERM™

## (undated) DEVICE — SPONGE,PEANUT,XRAY,ST,SM,3/8",5/CARD: Brand: MEDLINE INDUSTRIES, INC.

## (undated) DEVICE — SYRINGE,10ML,TR/FR,MLL,W/RES: Brand: NAMIC

## (undated) DEVICE — PENCIL SMK EVAC TELSCP 3 M TBNG

## (undated) DEVICE — Device

## (undated) DEVICE — GOWN SIRUS NONREIN LG W/TWL: Brand: MEDLINE INDUSTRIES, INC.

## (undated) DEVICE — 1LYRTR 16FR10ML 100%SILI SNAP: Brand: MEDLINE INDUSTRIES, INC.

## (undated) DEVICE — COVER INSTRUMENT LOCALIZER

## (undated) DEVICE — GUIDEWIRE VASC L150CM DIA0.035IN TAPR 3CM HYDRPHLC NIT ANG

## (undated) DEVICE — PAD ABSRB W8XL10IN ABD HYDROPHOBIC NONWOVEN THCK LAYR CELOS

## (undated) DEVICE — SUTURE PERMA-HAND SZ 2-0 L30IN NONABSORBABLE BLK L26MM SH K833H

## (undated) DEVICE — DEVICE SPEC PERF COMPR PLT FOR SPEC RADIOGRAPHY TRANSPEC

## (undated) DEVICE — SUTURE VCRL + SZ 3-0 L18IN ABSRB UD SH 1/2 CIR TAPERCUT NDL VCP864D

## (undated) DEVICE — 3M™ TEGADERM™ CHG CHLORHEXIDINE GLUCONATE GEL PAD 1664, 25 EACH/CARTON, 4 CARTONS/CASE: Brand: 3M™ TEGADERM™

## (undated) DEVICE — 3M™ TEGADERM™ TRANSPARENT FILM DRESSING FRAME STYLE, 1627, 4 IN X 10 IN (10 CM X 25 CM), 20/CT 4CT/CASE: Brand: 3M™ TEGADERM™

## (undated) DEVICE — SUTURE MONOCRYL SZ 3-0 L18IN ABSRB UD L19MM PS-2 3/8 CIR PRIM Y497G